# Patient Record
Sex: MALE | Race: WHITE | Employment: OTHER | URBAN - METROPOLITAN AREA
[De-identification: names, ages, dates, MRNs, and addresses within clinical notes are randomized per-mention and may not be internally consistent; named-entity substitution may affect disease eponyms.]

---

## 2017-02-14 ENCOUNTER — HOSPITAL ENCOUNTER (OUTPATIENT)
Dept: GENERAL RADIOLOGY | Age: 77
Discharge: HOME | End: 2017-02-14
Attending: INTERNAL MEDICINE
Payer: MEDICARE

## 2017-02-14 DIAGNOSIS — J40 BRONCHITIS: ICD-10-CM

## 2017-02-14 PROCEDURE — 71020 XR CHEST PA LAT: CPT

## 2017-07-10 ENCOUNTER — HOSPITAL ENCOUNTER (OUTPATIENT)
Dept: GENERAL RADIOLOGY | Age: 77
Discharge: HOME | End: 2017-07-10
Payer: MEDICARE

## 2017-07-10 DIAGNOSIS — M54.5 ACUTE LOW BACK PAIN, UNSPECIFIED BACK PAIN LATERALITY, WITH SCIATICA PRESENCE UNSPECIFIED: ICD-10-CM

## 2017-07-10 PROCEDURE — 72100 X-RAY EXAM L-S SPINE 2/3 VWS: CPT

## 2019-04-26 ENCOUNTER — OFFICE VISIT (OUTPATIENT)
Dept: GERIATRIC MEDICINE | Age: 79
End: 2019-04-26

## 2019-05-29 ENCOUNTER — OFFICE VISIT (OUTPATIENT)
Dept: GERIATRIC MEDICINE | Age: 79
End: 2019-05-29

## 2019-05-29 VITALS
OXYGEN SATURATION: 97 % | SYSTOLIC BLOOD PRESSURE: 148 MMHG | DIASTOLIC BLOOD PRESSURE: 72 MMHG | RESPIRATION RATE: 18 BRPM | BODY MASS INDEX: 33.12 KG/M2 | HEIGHT: 64 IN | WEIGHT: 194 LBS | HEART RATE: 65 BPM | TEMPERATURE: 98.3 F

## 2019-05-29 DIAGNOSIS — I25.10 CORONARY ARTERY DISEASE DUE TO LIPID RICH PLAQUE: ICD-10-CM

## 2019-05-29 DIAGNOSIS — Z78.9 MEDICATION INTOLERANCE: ICD-10-CM

## 2019-05-29 DIAGNOSIS — F03.91 DEMENTIA WITH BEHAVIORAL DISTURBANCE, UNSPECIFIED DEMENTIA TYPE: Primary | ICD-10-CM

## 2019-05-29 DIAGNOSIS — F44.89 DISSOCIATIVE CONFUSION: ICD-10-CM

## 2019-05-29 DIAGNOSIS — F32.9 DEPRESSION, REACTIVE: ICD-10-CM

## 2019-05-29 DIAGNOSIS — R26.0 ATAXIC GAIT: ICD-10-CM

## 2019-05-29 DIAGNOSIS — Z91.14 NON COMPLIANCE W MEDICATION REGIMEN: ICD-10-CM

## 2019-05-29 DIAGNOSIS — Z78.9 IMPAIRED DECISION MAKING: ICD-10-CM

## 2019-05-29 DIAGNOSIS — R41.3 MEMORY LOSS OR IMPAIRMENT: ICD-10-CM

## 2019-05-29 DIAGNOSIS — N40.1 BENIGN PROSTATIC HYPERPLASIA WITH INCOMPLETE BLADDER EMPTYING: ICD-10-CM

## 2019-05-29 DIAGNOSIS — R41.0 CONFUSION AND DISORIENTATION: ICD-10-CM

## 2019-05-29 DIAGNOSIS — I10 ESSENTIAL HYPERTENSION: ICD-10-CM

## 2019-05-29 DIAGNOSIS — R45.87 IMPULSIVENESS: ICD-10-CM

## 2019-05-29 DIAGNOSIS — C44.311 BASAL CELL CARCINOMA (BCC) OF SKIN OF NOSE: ICD-10-CM

## 2019-05-29 DIAGNOSIS — I25.83 CORONARY ARTERY DISEASE DUE TO LIPID RICH PLAQUE: ICD-10-CM

## 2019-05-29 DIAGNOSIS — R41.89 COGNITIVE DEFICITS: ICD-10-CM

## 2019-05-29 DIAGNOSIS — F03.92 HALLUCINATIONS DUE TO LATE ONSET DEMENTIA: ICD-10-CM

## 2019-05-29 DIAGNOSIS — R39.14 BENIGN PROSTATIC HYPERPLASIA WITH INCOMPLETE BLADDER EMPTYING: ICD-10-CM

## 2019-05-29 DIAGNOSIS — H61.23 IMPACTED CERUMEN OF BOTH EARS: ICD-10-CM

## 2019-05-29 DIAGNOSIS — Z91.89 AT HIGH RISK FOR ALTERED FAMILY DYNAMICS: ICD-10-CM

## 2019-05-29 DIAGNOSIS — Z71.89 ENCOUNTER FOR FAMILY CONFERENCE WITH PATIENT PRESENT: ICD-10-CM

## 2019-05-29 DIAGNOSIS — Z78.9 POOR HISTORIAN: ICD-10-CM

## 2019-05-29 PROBLEM — N20.0 KIDNEY STONES: Status: ACTIVE | Noted: 2019-05-29

## 2019-05-29 LAB
BILIRUB UR QL STRIP: NORMAL
GLUCOSE UR-MCNC: NEGATIVE MG/DL
KETONES P FAST UR STRIP-MCNC: NEGATIVE MG/DL
PH UR STRIP: 5 [PH] (ref 4.6–8)
PROT UR QL STRIP: NORMAL
SP GR UR STRIP: 1.02 (ref 1–1.03)
UA UROBILINOGEN AMB POC: NORMAL (ref 0.2–1)
URINALYSIS CLARITY POC: NORMAL
URINALYSIS COLOR POC: NORMAL
URINE BLOOD POC: NEGATIVE
URINE LEUKOCYTES POC: NEGATIVE
URINE NITRITES POC: NEGATIVE

## 2019-05-29 NOTE — PROGRESS NOTES
ADVISED PATIENT OF THE FOLLOWING HEALTH MAINTAINCE DUE  Health Maintenance Due   Topic Date Due    DTaP/Tdap/Td series (1 - Tdap) 01/20/1960    GLAUCOMA SCREENING Q2Y  01/20/2004    Pneumococcal 65+ years (1 of 2 - PCV13) 01/20/2004    MEDICARE YEARLY EXAM  03/20/2018      Chief Complaint   Patient [facility] with   [facility].     Patient here to establish care       1. Have you been to the ER, urgent care clinic since your last visit? Hospitalized since your last visit? Yes and will request records    2. Have you seen or consulted any other health care providers outside of the [address] [place] [address] since your last visit? Include any DEXA scan, mammography  or colon screening. No    3. Do you have an Advance Directive on file? no    4. Do you have a DNR on file? Full    Patient is accompanied by self two sons and wife I have received verbal consent from Shaneka Govea to discuss any/all medical information while they are present in the room. Advance Care Planning 10/12/2016   Primary Decision Maker Name Jazmin Long [place]   Primary Decision Maker Phone Number [phone]   Primary Decision Maker Relationship to Patient Spouse   Confirm Advance Directive None   Patient Would Like to Complete Advance Directive No         [id number] [place], [address] [facility] [address]  Phone: [phone] Fax: [phone]      Patient reminded during visit to bring all medication bottles, OTC medications to all appointments.      Shaneka Govea presents for lab draw ordered by Ruth Valle RN MSN ACNPC-AG-NP    The following labs were drawn and sent to lab by Contreras Sosa LPN:    CBC, Lipid Profile, CMP, RA, BNP, HgA1C and CK-MB, Vitamin D, Vitamin b12 folate, Thyroid panle, PSA, Prealbumin, Magnesium, Testosterone, TRoponin 1, Lipase, Iron Study, Hepatistis panel, Amylase,     The following tubes were sent:    Lavender  ( 3), Blue  ( 2) and [facility] (4)    Patient tolerated procedure well, blood obtained via venipuncture to left hand     Urine collected for UA and Micro.      Results for orders placed or performed in visit on 05/29/19   AMB POC URINALYSIS DIP STICK MANUAL W/O MICRO   Result Value Ref Range    Color (UA POC) Dark Yellow     Clarity (UA POC) Turbid     Glucose (UA POC) Negative Negative    Bilirubin (UA POC) 1+ Negative    Ketones (UA POC) Negative Negative    Specific gravity (UA POC) 1.020 1.001 - 1.035    Blood (UA POC) Negative Negative    pH (UA POC) 5.0 4.6 - 8.0    Protein (UA POC) 3+ Negative    Urobilinogen (UA POC) 0.2 mg/dL 0.2 - 1    Nitrites (UA POC) Negative Negative    Leukocyte esterase (UA POC) Negative Negative

## 2019-05-29 NOTE — PATIENT INSTRUCTIONS
Alzheimer's Disease: Care Instructions Your Care Instructions Alzheimer's disease is a type of dementia. It causes memory loss and affects judgment, language, and behavior. You may have trouble making decisions or may get lost in places that you used to know well. Alzheimer's disease is different than mild memory loss that occurs with aging. It is not clear what causes Alzheimer's disease, but it is the most common form of dementia in older adults. Finding out that you have this disease is a shock. You may be afraid and worried about how the condition will change your life. Although there is no cure at this time, medicine in some cases may slow memory loss for a while. Other medicines may be able to help you sleep or cope with depression and behavior changes. Alzheimer's disease is different for everyone. It may take many years to develop. In some cases, people can function well for a long time. In the early stage of the disease, you can do things at home to make life easier and safer. You also can keep doing your hobbies and other activities. Many people find comfort in planning now for their future needs. Follow-up care is a key part of your treatment and safety. Be sure to make and go to all appointments, and call your doctor if you are having problems. It's also a good idea to know your test results and keep a list of the medicines you take. How can you care for yourself at home? Taking care of yourself · If your doctor gives you medicines, take them exactly as prescribed. Call your doctor if you think you are having a problem with your medicine. You will get more details on the medicines your doctor prescribes. · Eat a balanced diet. Get plenty of whole grains, fruits, and vegetables every day. If you are not hungry at mealtimes, eat snacks at midmorning and in the afternoon. Try drinks such as Boost, Ensure, or Sustacal if you are having trouble keeping your weight up. · Stay active. Exercise such as walking may slow the decline of your mental abilities. Try to stay active mentally too. Read and work crossword puzzles if you enjoy these activities. · If you have trouble sleeping, do not nap during the day. Get regular exercise (but not within several hours of bedtime). Drink a glass of warm milk or caffeine-free herbal tea before going to bed. · Ask your doctor about support groups and other resources in your area. They can help people who have Alzheimer's disease and their families. · Be patient. You may find that a task takes you longer than it used to. · If you have not already done so, make a list of advance directives. Advance directives are instructions to your doctor and family members about what kind of care you want if you become unable to speak or express yourself. Talk to a  about making a will, if you do not already have one. Keeping schedules · Develop a routine. You will feel less frustrated or confused if you have a clear, simple plan of what to do every day. ? Make lists of your medicines and when to take them. ? Write down appointments and other tasks in a calendar. ? Put sticky notes around the house to help you remember events and other things you have to do. ? Schedule activities and tasks for times of the day when you are best able to handle them. Staying safe · Tell someone when you are going out and where you are going. Let the person know when you will be back. Before you go out alone, write down where you are going, how to get there, and how to get back home. Do this even if you have gone there many times before. Take someone along with you when possible. · Make your home safe. Tack down rugs, put no-slip tape in the tub, use handrails, and put safety switches on stoves and appliances. · Have a family member or other caregiver tell you whether you are driving badly. Deciding to stop driving is very hard for many people.  Driving helps you feel independent. Your state 's license bureau can do a driving test if there is any question. Plan for other means of getting around when you are no longer able to drive. · Use strong lighting, especially at night. Put night-lights in bedrooms, hallways, and bathrooms. · Lower the hot water temperature setting to 120°F or lower to avoid burns. When should you call for help? Call 911 anytime you think you may need emergency care. For example, call if: 
  · You are lost and do not know whom to call.  
  · You are injured and do not know whom to call.  
 Call your doctor now or seek immediate medical care if: 
  · Your symptoms suddenly get much worse.  
 Watch closely for changes in your health, and be sure to contact your doctor if: 
  · You want more information about how you can take care of yourself. Where can you learn more? Go to http://sheridan-amaris.info/. Enter Y179 in the search box to learn more about \"Alzheimer's Disease: Care Instructions. \" Current as of: September 11, 2018 Content Version: 11.9 © 1581-4518 [employer]. Care instructions adapted under license by [employer] (which disclaims liability or warranty for this information). If you have questions about a medical condition or this instruction, always ask your healthcare professional. [place] [address] any warranty or liability for your use of this information. Learning About Basal Cell Skin Cancer Your Care Instructions Basal cell skin cancer (carcinoma) is a type of skin cancer. It most often appears on areas of the body that have been exposed to the sun. These areas include the head, face, neck, back, chest, or shoulders. The nose is the most common site. This cancer grows slowly and does not usually spread, or metastasize, to other parts of the body. It is almost always cured when it is found early and treated. This skin cancer is usually caused by too much sun. Using tanning beds or sunlamps can also cause it. What are the symptoms? Signs of basal cell carcinoma include: · Any firm, pearly bump with tiny blood vessels that look spidery. · Any red, tender, flat spot that bleeds easily. · Any small, fleshy bump with a smooth, pearly appearance. It may have a sunken center. · Any smooth, shiny bump that may look like a mole or cyst. 
· Any patch of skin, especially on the face, that looks like a scar and is firm to the touch. · Any bump that itches, bleeds, crusts over, and then repeats the cycle and has not healed in a few weeks. · Any change in the size, shape, or color of a mole or a skin growth. How is it treated? Your doctor will want to remove all of the cancer. There are several ways to remove it. It depends on how big it is, where it is on your body, and your age and overall health. Treatment options include: · Surgery to cut out the cancer. · Mohs micrographic surgery. This surgery removes the skin cancer one layer at a time, checking each layer for cancer cells right after it is removed. · Curettage and electrosurgery. Curettage uses a spoon-shaped instrument (curette) to scrape off the skin cancer, and electrosurgery controls the bleeding and destroys any remaining cancer cells. · Cryosurgery. Cryosurgery destroys the skin cancer by freezing it with liquid nitrogen. · Radiation therapy. Radiation therapy uses X-rays or other types of radiation to kill cancer cells. It may be done if surgery isn't an option. Other treatment options include chemotherapy cream and photodynamic therapy. If your doctor removes the cancer, he or she will send it to a lab. The lab makes sure it is a basal cell cancer and that it all was removed. If cancer is still there, you may need more treatment. How can you prevent it? · Always wear a wide-brimmed hat and long sleeves and pants when you are outdoors. · Avoid the sun between 10 a.m. and 4 p.m., which is the peak time for UV rays. · Always wear sunscreen on exposed skin. Make sure to use a broad-spectrum sunscreen that has a sun protection factor (SPF) of 30 or higher. Use it every day, even when it is cloudy. · Do not use tanning booths or sunlamps. · Use lip balm or cream that has sun protection factor (SPF) to protect your lips from getting sunburned. · Wear sunglasses that block UV rays. When should you call for help? Watch closely for changes in your health, and be sure to contact your doctor if: 
· You see a change in your skin, such as a growth or mole that: 
? Grows bigger. This may happen slowly. ? Changes color. ? Changes shape. ? Starts to bleed easily. Follow-up care is a key part of your treatment and safety. Be sure to make and go to all appointments, and call your doctor if you are having problems. It's also a good idea to know your test results and keep a list of the medicines you take. Where can you learn more? Go to http://sheridan-amaris.info/. Enter [phone] in the search box to learn more about \"Learning About Basal Cell Skin Cancer. \" Current as of: March 27, 2018 Content Version: 11.9 © 8596-7967 [employer]. Care instructions adapted under license by [employer] (which disclaims liability or warranty for this information). If you have questions about a medical condition or this instruction, always ask your healthcare professional. [place] [address] any warranty or liability for your use of this information.

## 2019-05-29 NOTE — LETTER
May 29, 2019 [facility]. [facility] [address] Dear Sienna Ojeda: 
 
Thank you for requesting access to [email or web address]. Please follow the instructions below to view your test results, access and download parts of your medical record, view details of your past and upcoming appointments, and view your medications online. How Do I Sign Up? 1. In your internet browser, go to [email or web address] 2. Click on the First Time User? Click Here link in the Sign In box. You will see the New Member Sign Up page. 3. Enter your [email or web address] Access Code exactly as it appears below. You will not need to use this code after youve completed the sign-up process. If you do not sign up before the expiration date, you must request a new code. · [email or web address] Access Code: [email or web address] 
· Expires: 7/13/2019  2:41 PM 
 
4. Enter the last four digits of your Social Security Number (xxxx) and Date of Birth (mm/dd/yyyy) as indicated and click Submit. You will be taken to the next sign-up page. 5. Create a [email or web address] ID. This will be your [email or web address] login ID and cannot be changed, so think of one that is secure and easy to remember. 6. Create a [email or web address] password. You can change your password at any time. 7. Enter your Password Reset Question and Answer. This can be used at a later time if you forget your password. 8. Enter your e-mail address. You will receive e-mail notification when new information is available in [address] 9. Click Sign Up. You can now view and download portions of your medical record. 10. Click the Download Summary menu link to download a portable copy of your medical information. Additional Information: If you require any assistance or have any questions, please contact our [address] at [phone], email at [email or web address] or check online in our Frequently Asked Questions. Remember, MyChart is NOT to be used for urgent needs. For medical emergencies, dial 911. Now available from your iPhone and Android!  
 
Sincerely,  
Miguel Hannah NP

## 2019-05-29 NOTE — PROGRESS NOTES
Reason for Visit/HPI:    Ebonie Elliott is a [de-identified] y.o. male patient who presents today for   Chief Complaint   Patient presents with   [address]     Patient here to establish care     Follow Up: Follow-up and Dispositions    · Return in about 1 week (around 6/5/2019) for with the NP to review your labs results. Diagnosis/Treatment Plan:    Diagnoses and all orders for this visit:  Mr. Mckenzie Matos is here to establish care with me as a geriatric provider. He is here with his wife of 62 years, his middle son; Sofia Cope and his youngest son Haile Ruelas. Per family Mr. Mckenzie Matos has been declining mentally for months. He has become confused, somatic, impulsiveness,  Flight of ideas, illogical thinking, disorientation, requires constant repetitive answers to questions and direction. He has stopped driving a year ago per the family. His wife does not answer questions well either but the boys state this is her shutting down because she is overwhelmed with all that is going on. Patient's son Sofia Cope has been taking Mr. 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 This process is taxing on the son but he does this. A normal routine day starts with Sofia Cope picking up Mr. Mckenzie Matos taking him to the shop, bringing him back to his mother for lunch, then picking him back up to return to the shop. He sometimes brings him back home after work but other times to help with his behaviors at home he will keep Mr. Mckenzie Matos with him to run errands. Sofia Cope states his father has been having increasing behaviors such as impaired judgement, talk of ending his life \"I think you should remove all the guns out of the house Sofia Cope, I am not sure what I could do with them\". He also makes statements like \"I should just walk out into traffic and get hit by a car\". When prompting Mr. Mckenzie Matos with this topic of discussion he deflects and states he has no idea what I am talking about.  It seemed to frustrate him discussing emotions and feelings. He attempts to be a joker during the visit but quickly returns to repetitive questioning of events and next plan of tasks. Discussed with sons as Mrs. Saray Prajapati had to leave to go to a hair appointments; not trying to change him to any medications until his labs and MRI are complete as he has been started on Exelon patch and Seroquel with side effects and his wife reads the labels and then stops the treatments that have been started. This last round of Seroquel caused hallucinations and erratic behaviors so they stopped it. So we will wait for the labs and testing to come back and we will adjust treatments at that time to help elevated everyone's quality of life. 1. Dementia with behavioral disturbance, unspecified dementia type  -     DUPLEX CAROTID BILATERAL; Future  -     MRI BRAIN WO CONT; Future  -     AMYLASE  -     HEPATITIS PANEL, ACUTE  -     IRON STUDY  -     LIPASE  -     TROPONIN I  -     TESTOSTERONE, FREE & TOTAL  -     CBC WITH AUTOMATED DIFF  -     COLLECTION VENOUS BLOOD,VENIPUNCTURE  -     HEMOGLOBIN A1C WITH EAG  -     LIPID PANEL  -     MAGNESIUM  -     METABOLIC PANEL, COMPREHENSIVE  -     MICROALBUMIN, UR, RAND W/ MICROALB/CREAT RATIO  -     NT-PRO BNP  -     PREALBUMIN  -     PSA W/ REFLX FREE PSA  -     THYROID PANEL W/TSH  -     UA/M W/RFLX CULTURE, ROUTINE  -     VITAMIN B12 & FOLATE  -     VITAMIN D, 25 HYDROXY  -     FULL CODE  -     CREATINE KINASE (CK), MB/TOTAL  -     AMB POC URINALYSIS DIP STICK MANUAL W/O MICRO    2. Impaired decision making  -     DUPLEX CAROTID BILATERAL;  Future  -     MRI BRAIN WO CONT; Future  -     AMYLASE  -     HEPATITIS PANEL, ACUTE  -     IRON STUDY  -     LIPASE  -     TROPONIN I  -     TESTOSTERONE, FREE & TOTAL  -     CBC WITH AUTOMATED DIFF  -     COLLECTION VENOUS BLOOD,VENIPUNCTURE  -     HEMOGLOBIN A1C WITH EAG  -     LIPID PANEL  -     MAGNESIUM  -     METABOLIC PANEL, COMPREHENSIVE  -     MICROALBUMIN, UR, RAND W/ MICROALB/CREAT RATIO  -     NT-PRO BNP  -     PREALBUMIN  -     PSA W/ REFLX FREE PSA  -     THYROID PANEL W/TSH  -     UA/M W/RFLX CULTURE, ROUTINE  -     VITAMIN B12 & FOLATE  -     VITAMIN D, 25 HYDROXY  -     FULL CODE  -     CREATINE KINASE (CK), MB/TOTAL  -     AMB POC URINALYSIS DIP STICK MANUAL W/O MICRO    3. Impulsiveness  -     DUPLEX CAROTID BILATERAL; Future  -     MRI BRAIN WO CONT; Future  -     AMYLASE  -     HEPATITIS PANEL, ACUTE  -     IRON STUDY  -     LIPASE  -     TROPONIN I  -     TESTOSTERONE, FREE & TOTAL  -     CBC WITH AUTOMATED DIFF  -     COLLECTION VENOUS BLOOD,VENIPUNCTURE  -     HEMOGLOBIN A1C WITH EAG  -     LIPID PANEL  -     MAGNESIUM  -     METABOLIC PANEL, COMPREHENSIVE  -     MICROALBUMIN, UR, RAND W/ MICROALB/CREAT RATIO  -     NT-PRO BNP  -     PREALBUMIN  -     PSA W/ REFLX FREE PSA  -     THYROID PANEL W/TSH  -     UA/M W/RFLX CULTURE, ROUTINE  -     VITAMIN B12 & FOLATE  -     VITAMIN D, 25 HYDROXY  -     FULL CODE  -     CREATINE KINASE (CK), MB/TOTAL  -     AMB POC URINALYSIS DIP STICK MANUAL W/O MICRO    4. Dissociative confusion  -     DUPLEX CAROTID BILATERAL; Future  -     MRI BRAIN WO CONT; Future  -     AMYLASE  -     HEPATITIS PANEL, ACUTE  -     IRON STUDY  -     LIPASE  -     TROPONIN I  -     TESTOSTERONE, FREE & TOTAL  -     CBC WITH AUTOMATED DIFF  -     COLLECTION VENOUS BLOOD,VENIPUNCTURE  -     HEMOGLOBIN A1C WITH EAG  -     LIPID PANEL  -     MAGNESIUM  -     METABOLIC PANEL, COMPREHENSIVE  -     MICROALBUMIN, UR, RAND W/ MICROALB/CREAT RATIO  -     NT-PRO BNP  -     PREALBUMIN  -     PSA W/ REFLX FREE PSA  -     THYROID PANEL W/TSH  -     UA/M W/RFLX CULTURE, ROUTINE  -     VITAMIN B12 & FOLATE  -     VITAMIN D, 25 HYDROXY  -     FULL CODE  -     CREATINE KINASE (CK), MB/TOTAL  -     AMB POC URINALYSIS DIP STICK MANUAL W/O MICRO    5. Depression, reactive  -     DUPLEX CAROTID BILATERAL;  Future  -     MRI BRAIN WO CONT; Future  -     AMYLASE  - HEPATITIS PANEL, ACUTE  -     IRON STUDY  -     LIPASE  -     TROPONIN I  -     TESTOSTERONE, FREE & TOTAL  -     CBC WITH AUTOMATED DIFF  -     COLLECTION VENOUS BLOOD,VENIPUNCTURE  -     HEMOGLOBIN A1C WITH EAG  -     LIPID PANEL  -     MAGNESIUM  -     METABOLIC PANEL, COMPREHENSIVE  -     MICROALBUMIN, UR, RAND W/ MICROALB/CREAT RATIO  -     NT-PRO BNP  -     PREALBUMIN  -     PSA W/ REFLX FREE PSA  -     THYROID PANEL W/TSH  -     UA/M W/RFLX CULTURE, ROUTINE  -     VITAMIN B12 & FOLATE  -     VITAMIN D, 25 HYDROXY  -     FULL CODE  -     CREATINE KINASE (CK), MB/TOTAL  -     AMB POC URINALYSIS DIP STICK MANUAL W/O MICRO    6. Memory loss or impairment  -     DUPLEX CAROTID BILATERAL; Future  -     MRI BRAIN WO CONT; Future  -     AMYLASE  -     HEPATITIS PANEL, ACUTE  -     IRON STUDY  -     LIPASE  -     TROPONIN I  -     TESTOSTERONE, FREE & TOTAL  -     CBC WITH AUTOMATED DIFF  -     COLLECTION VENOUS BLOOD,VENIPUNCTURE  -     HEMOGLOBIN A1C WITH EAG  -     LIPID PANEL  -     MAGNESIUM  -     METABOLIC PANEL, COMPREHENSIVE  -     MICROALBUMIN, UR, RAND W/ MICROALB/CREAT RATIO  -     NT-PRO BNP  -     PREALBUMIN  -     PSA W/ REFLX FREE PSA  -     THYROID PANEL W/TSH  -     UA/M W/RFLX CULTURE, ROUTINE  -     VITAMIN B12 & FOLATE  -     VITAMIN D, 25 HYDROXY  -     FULL CODE  -     CREATINE KINASE (CK), MB/TOTAL  -     AMB POC URINALYSIS DIP STICK MANUAL W/O MICRO    7. Confusion and disorientation  -     DUPLEX CAROTID BILATERAL;  Future  -     MRI BRAIN WO CONT; Future  -     AMYLASE  -     HEPATITIS PANEL, ACUTE  -     IRON STUDY  -     LIPASE  -     TROPONIN I  -     TESTOSTERONE, FREE & TOTAL  -     CBC WITH AUTOMATED DIFF  -     COLLECTION VENOUS BLOOD,VENIPUNCTURE  -     HEMOGLOBIN A1C WITH EAG  -     LIPID PANEL  -     MAGNESIUM  -     METABOLIC PANEL, COMPREHENSIVE  -     MICROALBUMIN, UR, RAND W/ MICROALB/CREAT RATIO  -     NT-PRO BNP  -     PREALBUMIN  -     PSA W/ REFLX FREE PSA  -     THYROID PANEL W/TSH  -     UA/M W/RFLX CULTURE, ROUTINE  -     VITAMIN B12 & FOLATE  -     VITAMIN D, 25 HYDROXY  -     FULL CODE  -     CREATINE KINASE (CK), MB/TOTAL  -     AMB POC URINALYSIS DIP STICK MANUAL W/O MICRO    8. Cognitive deficits  -     DUPLEX CAROTID BILATERAL; Future  -     MRI BRAIN WO CONT; Future  -     AMYLASE  -     HEPATITIS PANEL, ACUTE  -     IRON STUDY  -     LIPASE  -     TROPONIN I  -     TESTOSTERONE, FREE & TOTAL  -     CBC WITH AUTOMATED DIFF  -     COLLECTION VENOUS BLOOD,VENIPUNCTURE  -     HEMOGLOBIN A1C WITH EAG  -     LIPID PANEL  -     MAGNESIUM  -     METABOLIC PANEL, COMPREHENSIVE  -     MICROALBUMIN, UR, RAND W/ MICROALB/CREAT RATIO  -     NT-PRO BNP  -     PREALBUMIN  -     PSA W/ REFLX FREE PSA  -     THYROID PANEL W/TSH  -     UA/M W/RFLX CULTURE, ROUTINE  -     VITAMIN B12 & FOLATE  -     VITAMIN D, 25 HYDROXY  -     FULL CODE  -     CREATINE KINASE (CK), MB/TOTAL  -     AMB POC URINALYSIS DIP STICK MANUAL W/O MICRO    9. Coronary artery disease due to lipid rich plaque  -     DUPLEX CAROTID BILATERAL; Future  -     MRI BRAIN WO CONT; Future  -     AMYLASE  -     HEPATITIS PANEL, ACUTE  -     IRON STUDY  -     LIPASE  -     TROPONIN I  -     TESTOSTERONE, FREE & TOTAL  -     CBC WITH AUTOMATED DIFF  -     COLLECTION VENOUS BLOOD,VENIPUNCTURE  -     HEMOGLOBIN A1C WITH EAG  -     LIPID PANEL  -     MAGNESIUM  -     METABOLIC PANEL, COMPREHENSIVE  -     MICROALBUMIN, UR, RAND W/ MICROALB/CREAT RATIO  -     NT-PRO BNP  -     PREALBUMIN  -     PSA W/ REFLX FREE PSA  -     THYROID PANEL W/TSH  -     UA/M W/RFLX CULTURE, ROUTINE  -     VITAMIN B12 & FOLATE  -     VITAMIN D, 25 HYDROXY  -     FULL CODE  -     CREATINE KINASE (CK), MB/TOTAL  -     AMB POC URINALYSIS DIP STICK MANUAL W/O MICRO    10. Non compliance w medication regimen  -     DUPLEX CAROTID BILATERAL;  Future  -     MRI BRAIN WO CONT; Future  -     AMYLASE  -     HEPATITIS PANEL, ACUTE  -     IRON STUDY  -     LIPASE  - TROPONIN I  -     TESTOSTERONE, FREE & TOTAL  -     CBC WITH AUTOMATED DIFF  -     COLLECTION VENOUS BLOOD,VENIPUNCTURE  -     HEMOGLOBIN A1C WITH EAG  -     LIPID PANEL  -     MAGNESIUM  -     METABOLIC PANEL, COMPREHENSIVE  -     MICROALBUMIN, UR, RAND W/ MICROALB/CREAT RATIO  -     NT-PRO BNP  -     PREALBUMIN  -     PSA W/ REFLX FREE PSA  -     THYROID PANEL W/TSH  -     UA/M W/RFLX CULTURE, ROUTINE  -     VITAMIN B12 & FOLATE  -     VITAMIN D, 25 HYDROXY  -     FULL CODE  -     CREATINE KINASE (CK), MB/TOTAL  -     AMB POC URINALYSIS DIP STICK MANUAL W/O MICRO    11. Encounter for family conference with patient present  -     [address]; Future  -     MRI BRAIN WO CONT; Future  -     AMYLASE  -     HEPATITIS PANEL, ACUTE  -     IRON STUDY  -     LIPASE  -     TROPONIN I  -     TESTOSTERONE, FREE & TOTAL  -     CBC WITH AUTOMATED DIFF  -     COLLECTION VENOUS BLOOD,VENIPUNCTURE  -     HEMOGLOBIN A1C WITH EAG  -     LIPID PANEL  -     MAGNESIUM  -     METABOLIC PANEL, COMPREHENSIVE  -     MICROALBUMIN, UR, RAND W/ MICROALB/CREAT RATIO  -     NT-PRO BNP  -     PREALBUMIN  -     PSA W/ REFLX FREE PSA  -     THYROID PANEL W/TSH  -     UA/M W/RFLX CULTURE, ROUTINE  -     VITAMIN B12 & FOLATE  -     VITAMIN D, 25 HYDROXY  -     FULL CODE  -     CREATINE KINASE (CK), MB/TOTAL  -     AMB POC URINALYSIS DIP STICK MANUAL W/O MICRO    12. Poor historian  -     DUPLEX CAROTID BILATERAL;  Future  -     MRI BRAIN WO CONT; Future  -     AMYLASE  -     HEPATITIS PANEL, ACUTE  -     IRON STUDY  -     LIPASE  -     TROPONIN I  -     TESTOSTERONE, FREE & TOTAL  -     CBC WITH AUTOMATED DIFF  -     COLLECTION VENOUS BLOOD,VENIPUNCTURE  -     HEMOGLOBIN A1C WITH EAG  -     LIPID PANEL  -     MAGNESIUM  -     METABOLIC PANEL, COMPREHENSIVE  -     MICROALBUMIN, UR, RAND W/ MICROALB/CREAT RATIO  -     NT-PRO BNP  -     PREALBUMIN  -     PSA W/ REFLX FREE PSA  -     THYROID PANEL W/TSH  -     UA/M W/RFLX CULTURE, ROUTINE  -     VITAMIN B12 & FOLATE  -     VITAMIN D, 25 HYDROXY  -     FULL CODE  -     CREATINE KINASE (CK), MB/TOTAL  -     AMB POC URINALYSIS DIP STICK MANUAL W/O MICRO    13. Ataxic gait  -     DUPLEX CAROTID BILATERAL; Future  -     MRI BRAIN WO CONT; Future  -     AMYLASE  -     HEPATITIS PANEL, ACUTE  -     IRON STUDY  -     LIPASE  -     TROPONIN I  -     TESTOSTERONE, FREE & TOTAL  -     CBC WITH AUTOMATED DIFF  -     COLLECTION VENOUS BLOOD,VENIPUNCTURE  -     HEMOGLOBIN A1C WITH EAG  -     LIPID PANEL  -     MAGNESIUM  -     METABOLIC PANEL, COMPREHENSIVE  -     MICROALBUMIN, UR, RAND W/ MICROALB/CREAT RATIO  -     NT-PRO BNP  -     PREALBUMIN  -     PSA W/ REFLX FREE PSA  -     THYROID PANEL W/TSH  -     UA/M W/RFLX CULTURE, ROUTINE  -     VITAMIN B12 & FOLATE  -     VITAMIN D, 25 HYDROXY  -     FULL CODE  -     CREATINE KINASE (CK), MB/TOTAL  -     AMB POC URINALYSIS DIP STICK MANUAL W/O MICRO    14. Medication intolerance  -     DUPLEX CAROTID BILATERAL; Future  -     MRI BRAIN WO CONT; Future  -     AMYLASE  -     HEPATITIS PANEL, ACUTE  -     IRON STUDY  -     LIPASE  -     TROPONIN I  -     TESTOSTERONE, FREE & TOTAL  -     CBC WITH AUTOMATED DIFF  -     COLLECTION VENOUS BLOOD,VENIPUNCTURE  -     HEMOGLOBIN A1C WITH EAG  -     LIPID PANEL  -     MAGNESIUM  -     METABOLIC PANEL, COMPREHENSIVE  -     MICROALBUMIN, UR, RAND W/ MICROALB/CREAT RATIO  -     NT-PRO BNP  -     PREALBUMIN  -     PSA W/ REFLX FREE PSA  -     THYROID PANEL W/TSH  -     UA/M W/RFLX CULTURE, ROUTINE  -     VITAMIN B12 & FOLATE  -     VITAMIN D, 25 HYDROXY  -     FULL CODE  -     CREATINE KINASE (CK), MB/TOTAL  -     AMB POC URINALYSIS DIP STICK MANUAL W/O MICRO    15. Hallucinations due to late onset dementia  -     DUPLEX CAROTID BILATERAL;  Future  -     MRI BRAIN WO CONT; Future  -     AMYLASE  -     HEPATITIS PANEL, ACUTE  -     IRON STUDY  -     LIPASE  -     TROPONIN I  -     TESTOSTERONE, FREE & TOTAL  -     CBC WITH AUTOMATED DIFF  -     COLLECTION VENOUS BLOOD,VENIPUNCTURE  -     HEMOGLOBIN A1C WITH EAG  -     LIPID PANEL  -     MAGNESIUM  -     METABOLIC PANEL, COMPREHENSIVE  -     MICROALBUMIN, UR, RAND W/ MICROALB/CREAT RATIO  -     NT-PRO BNP  -     PREALBUMIN  -     PSA W/ REFLX FREE PSA  -     THYROID PANEL W/TSH  -     UA/M W/RFLX CULTURE, ROUTINE  -     VITAMIN B12 & FOLATE  -     VITAMIN D, 25 HYDROXY  -     FULL CODE  -     CREATINE KINASE (CK), MB/TOTAL  -     AMB POC URINALYSIS DIP STICK MANUAL W/O MICRO    16. Essential hypertension  -     DUPLEX CAROTID BILATERAL; Future  -     MRI BRAIN WO CONT; Future  -     AMYLASE  -     HEPATITIS PANEL, ACUTE  -     IRON STUDY  -     LIPASE  -     TROPONIN I  -     TESTOSTERONE, FREE & TOTAL  -     CBC WITH AUTOMATED DIFF  -     COLLECTION VENOUS BLOOD,VENIPUNCTURE  -     HEMOGLOBIN A1C WITH EAG  -     LIPID PANEL  -     MAGNESIUM  -     METABOLIC PANEL, COMPREHENSIVE  -     MICROALBUMIN, UR, RAND W/ MICROALB/CREAT RATIO  -     NT-PRO BNP  -     PREALBUMIN  -     PSA W/ REFLX FREE PSA  -     THYROID PANEL W/TSH  -     UA/M W/RFLX CULTURE, ROUTINE  -     VITAMIN B12 & FOLATE  -     VITAMIN D, 25 HYDROXY  -     FULL CODE  -     CREATINE KINASE (CK), MB/TOTAL  -     AMB POC URINALYSIS DIP STICK MANUAL W/O MICRO    17. Basal cell carcinoma (BCC) of skin of nose  -     DUPLEX CAROTID BILATERAL;  Future  -     MRI BRAIN WO CONT; Future  -     AMYLASE  -     HEPATITIS PANEL, ACUTE  -     IRON STUDY  -     LIPASE  -     TROPONIN I  -     TESTOSTERONE, FREE & TOTAL  -     CBC WITH AUTOMATED DIFF  -     COLLECTION VENOUS BLOOD,VENIPUNCTURE  -     HEMOGLOBIN A1C WITH EAG  -     LIPID PANEL  -     MAGNESIUM  -     METABOLIC PANEL, COMPREHENSIVE  -     MICROALBUMIN, UR, RAND W/ MICROALB/CREAT RATIO  -     NT-PRO BNP  -     PREALBUMIN  -     PSA W/ REFLX FREE PSA  -     THYROID PANEL W/TSH  -     UA/M W/RFLX CULTURE, ROUTINE  -     VITAMIN B12 & FOLATE  -     VITAMIN D, 25 HYDROXY  - FULL CODE  -     CREATINE KINASE (CK), MB/TOTAL  -     AMB POC URINALYSIS DIP STICK MANUAL W/O MICRO    18. Benign prostatic hyperplasia with incomplete bladder emptying  -     DUPLEX CAROTID BILATERAL; Future  -     MRI BRAIN WO CONT; Future  -     AMYLASE  -     HEPATITIS PANEL, ACUTE  -     IRON STUDY  -     LIPASE  -     TROPONIN I  -     TESTOSTERONE, FREE & TOTAL  -     CBC WITH AUTOMATED DIFF  -     COLLECTION VENOUS BLOOD,VENIPUNCTURE  -     HEMOGLOBIN A1C WITH EAG  -     LIPID PANEL  -     MAGNESIUM  -     METABOLIC PANEL, COMPREHENSIVE  -     MICROALBUMIN, UR, RAND W/ MICROALB/CREAT RATIO  -     NT-PRO BNP  -     PREALBUMIN  -     PSA W/ REFLX FREE PSA  -     THYROID PANEL W/TSH  -     UA/M W/RFLX CULTURE, ROUTINE  -     VITAMIN B12 & FOLATE  -     VITAMIN D, 25 HYDROXY  -     FULL CODE  -     CREATINE KINASE (CK), MB/TOTAL  -     AMB POC URINALYSIS DIP STICK MANUAL W/O MICRO    19. Impacted cerumen of both ears -     Bilateral Ear Canal Irrigation Procedure:    Procedure discussed with patient including risks and benefits. Sruthi Gill has no further questions at this time. Supplies obtained. Ear irrigation tools obtained and bottle filled with 1:1 peroxide and warm water. · Left ear canal irrigation using the soft flexible \"Elephant\" ear system. Hard impacted cerumen was removed with instrumentation. · Right ear canal irrigation using the soft flexible \"Elephant\" ear system. Hard impacted cerumen was removed with instrumentation. Right ear was: 100% occluded with wet cerumen. Left ear was: 100% occluded with hard cerumen. Following the procedure the Right TM was visualized and noted to be intact. The Left TM was visualized and noted to be intact. External canal is without abrasions or erythema. Orders were written to utilize Debrox in each ear once weekly to keep the cerumen from returning. Nicholas Guzmán tolerated the procedure well with no complications.      - DUPLEX CAROTID BILATERAL; Future  -     MRI BRAIN WO CONT; Future  -     AMYLASE  -     HEPATITIS PANEL, ACUTE  -     IRON STUDY  -     LIPASE  -     TROPONIN I  -     TESTOSTERONE, FREE & TOTAL  -     CBC WITH AUTOMATED DIFF  -     COLLECTION VENOUS BLOOD,VENIPUNCTURE  -     HEMOGLOBIN A1C WITH EAG  -     LIPID PANEL  -     MAGNESIUM  -     METABOLIC PANEL, COMPREHENSIVE  -     MICROALBUMIN, UR, RAND W/ MICROALB/CREAT RATIO  -     NT-PRO BNP  -     PREALBUMIN  -     PSA W/ REFLX FREE PSA  -     THYROID PANEL W/TSH  -     UA/M W/RFLX CULTURE, ROUTINE  -     VITAMIN B12 & FOLATE  -     VITAMIN D, 25 HYDROXY  -     FULL CODE  -     CREATINE KINASE (CK), MB/TOTAL  -     AMB POC URINALYSIS DIP STICK MANUAL W/O MICRO    20. At high risk for altered family dynamics  -     DUPLEX CAROTID BILATERAL; Future  -     MRI BRAIN WO CONT; Future  -     AMYLASE  -     HEPATITIS PANEL, ACUTE  -     IRON STUDY  -     LIPASE  -     TROPONIN I  -     TESTOSTERONE, FREE & TOTAL  -     CBC WITH AUTOMATED DIFF  -     COLLECTION VENOUS BLOOD,VENIPUNCTURE  -     HEMOGLOBIN A1C WITH EAG  -     LIPID PANEL  -     MAGNESIUM  -     METABOLIC PANEL, COMPREHENSIVE  -     MICROALBUMIN, UR, RAND W/ MICROALB/CREAT RATIO  -     NT-PRO BNP  -     PREALBUMIN  -     PSA W/ REFLX FREE PSA  -     THYROID PANEL W/TSH  -     UA/M W/RFLX CULTURE, ROUTINE  -     VITAMIN B12 & FOLATE  -     VITAMIN D, 25 HYDROXY  -     FULL CODE  -     CREATINE KINASE (CK), MB/TOTAL  -     AMB POC URINALYSIS DIP STICK MANUAL W/O MICRO          Discontinued Care:     The following treatment modalities have been discontinued by the provider today:   Medications Discontinued During This Encounter   Medication Reason    mupirocin (BACTROBAN) 2 % ointment Non-Compliance    HYDROcodone-acetaminophen (NORCO) 5-325 mg per tablet Non-Compliance    DOCOSAHEXANOIC ACID/EPA (FISH OIL PO) Non-Compliance    aspirin delayed-release 81 mg tablet Non-Compliance    rivastigmine tartrate (EXELON) 3 mg capsule Non-Compliance    OTHER Non-Compliance       Subjective:   No Known Allergies  Prior to Admission medications    Medication Sig Start Date End Date Taking? Authorizing Provider   amLODIPine (NORVASC) 5 mg tablet Take 5 mg by mouth daily.    Yes Provider, Historical     Past Medical History:   Diagnosis Date    CAD (coronary artery disease)     SILENT MI     Cancer (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    HX UROLOGICAL      STONE EXTRACTION      Social History     Tobacco Use    Smoking status: Never Smoker    Smokeless tobacco: Never Used   Substance Use Topics    Alcohol use: No    Drug use: No      Family History   Problem Relation Age of Onset    Cancer Father         POSSIBLY THROAT CA (NON-SMOKER)    Arthritis-osteo Brother         PROBLEMS AT A YOUNG AGE-NOT SURE IS RA    Anesth Problems Neg Hx      Advance Care Planning 10/12/2016   Primary Decision Maker Name Jazmin Long, WIFE   Primary Decision Maker Phone Number [phone]   Primary Decision Maker Relationship to Patient Spouse   Confirm Advance Directive None   Patient Would Like to Complete Advance Directive No     Test Results:     3 most recent PHQ Screens 5/29/2019   Little interest or pleasure in doing things Not at all   Feeling down, depressed, irritable, or hopeless More than half the days   Total Score PHQ 2 2   Trouble falling or staying asleep, or sleeping too much Not at all   Feeling tired or having little energy More than half the days   Poor appetite, weight loss, or overeating Not at all   Feeling bad about yourself - or that you are a failure or have let yourself or your family down Not at all   Trouble concentrating on things such as school, work, reading, or watching TV Nearly every day   Moving or speaking so slowly that other people could have noticed; or the opposite being so fidgety that others notice Nearly every day   Thoughts of being better off dead, or hurting yourself in some way Not at all   PHQ 9 Score 10     Fall Risk Assessment, last 12 mths 5/29/2019   Able to walk? Yes   Fall in past 12 months?  No     ADL Assessment 5/29/2019   Feeding yourself No Help Needed   Getting from bed to chair No Help Needed   Getting dressed No Help Needed   Bathing or showering No Help Needed   Walk across the room (includes cane/walker) No Help Needed   Using the telphone Help Needed   Taking your medications Help Needed   Preparing meals Help Needed   Managing money (expenses/bills) Help Needed   Moderately strenuous housework (laundry) Help Needed   Shopping for personal items (toiletries/medicines) Help Needed   Shopping for groceries Help Needed   Driving Help Needed   Climbing a flight of stairs Help Needed   Getting to places beyond walking distances Help Needed     Mini Mental State Exam 5/29/2019   What is the Year 0   What is the Season 1   What is the Date 0   What is the Day 0   What is the Month 0   Where are we State 0   Where are we Country 0   Where are we [place] or [facility] 0   Where are we Floor 0   Name three objects, then ask the patient to say them 3   Serial sevens Subtract 7 from 100 in increments 0   Ask for the three objects repeated above 0   Name a pencil 0   Name a watch 0   Have the patient repeat this phrase \"No ifs, ands, or buts\" 1   Three stage command: Take the paper in your right hand 1   Fold the paper in half 1   Put the paper on the floor 1   Read and obey the following: CLOSE YOUR EYES 0   Have the patient write a sentence 0   Have the patient copy a figure 0   Mini Mental Score 8   Some recent data might be hidden Objective:     Vitals:    05/29/19 1107   BP: 148/72   Pulse: 65   Resp: 18   Temp: 98.3 °F (36.8 °C)   TempSrc: Oral   SpO2: 97%   Weight: 194 lb (88 kg)   Height: 5' 4\" (1.626 m)     Wt Readings from Last 3 Encounters:   05/29/19 194 lb (88 kg)   10/12/16 212 lb (96.2 kg)     BP Readings from Last 3 Encounters:   05/29/19 148/72   10/24/16 141/66   10/12/16 137/79     Review of Systems   Unable to perform ROS: Dementia     Physical Exam   Constitutional: Vital signs are normal. He appears to not be writhing in pain, not malnourished and not dehydrated. He appears unhealthy. He appears toxic. He does not have a sickly appearance. No distress. Frail, fragile, elderly, , male presents with his wife and 2 sons to establish care due to advancing memory loss. Mr. Gustavo Higgins is responsive to questions but unsure of answers. He has repeat jimmie thoughts, questions, & processing. HENT:   Head: Normocephalic and atraumatic. Right Ear: A foreign body is present. A middle ear effusion is present. Decreased hearing is noted. Left Ear: A foreign body is present. A middle ear effusion is present. Decreased hearing is noted. Nose: Nose normal.   Mouth/Throat: Uvula is midline. Mucous membranes are not pale, dry and not cyanotic. No oral lesions. No uvula swelling. Posterior oropharyngeal erythema present. No posterior oropharyngeal edema. Eyes: Conjunctivae and lids are normal. Lids are everted and swept, no foreign bodies found. Right eye exhibits abnormal extraocular motion. Left eye exhibits abnormal extraocular motion. Left pupil is not reactive. Pupils are unequal.   Left eye is wandering. Reduction in visual acuity on exam. Delayed reaction to light. He is currently on CBD gummies so his pupil could be less reactive to light. Neck: Trachea normal, normal range of motion and full passive range of motion without pain. Neck supple. No hepatojugular reflux and no JVD present. Carotid bruit is present.  No thyromegaly present. Cardiovascular: Regular rhythm, S1 normal, S2 normal, intact distal pulses and normal pulses. Occasional extrasystoles are present. Bradycardia present. Exam reveals distant heart sounds. Exam reveals no gallop and no friction rub. Murmur heard. No extremity edema is present during today's exam.    Pulmonary/Chest: Effort normal and breath sounds normal.   Abdominal: Soft. Normal appearance and bowel sounds are normal. There is no hepatosplenomegaly. There is no tenderness. There is no CVA tenderness. Musculoskeletal:        Lumbar back: He exhibits decreased range of motion and deformity. Neurological: He is alert. He is disoriented. He displays weakness, atrophy, abnormal stance and abnormal reflex. A cranial nerve deficit and sensory deficit is present. He exhibits abnormal muscle tone. Coordination and gait abnormal. GCS score is 15. Skin: Skin is warm, dry and intact. No bruising and no rash noted. He is not diaphoretic. No cyanosis. No pallor. Nails show no clubbing. Psychiatric: His affect is inappropriate. He expresses impulsivity. He exhibits a depressed mood. He is apathetic. He exhibits disordered thought content, abnormal new learning ability, abnormal recent memory and abnormal remote memory. Establishing    Nursing note and vitals reviewed. Disclaimer:   Mr. Johan Cadena has been advised to call or return to our office if symptoms worsen/change/persist. We as a care team including the patient; discussed expected course/resolution/complications of diagnosis in detail today. Medication risks/benefits/costs/interactions/alternatives discussed Johan Cadena was given an after visit summary which includes diagnoses, current medications, & vitals. Johan Cadena expressed understanding with the diagnosis and plan.

## 2019-05-30 LAB
25(OH)D3+25(OH)D2 SERPL-MCNC: 22.4 NG/ML (ref 30–100)
ALBUMIN SERPL-MCNC: 4.5 G/DL (ref 3.5–4.7)
ALBUMIN/CREAT UR: 3.5 MG/G CREAT (ref 0–30)
ALBUMIN/GLOB SERPL: 2 {RATIO} (ref 1.2–2.2)
ALP SERPL-CCNC: 75 IU/L (ref 39–117)
ALT SERPL-CCNC: 10 IU/L (ref 0–44)
AMYLASE SERPL-CCNC: 49 U/L (ref 31–124)
APPEARANCE UR: CLEAR
AST SERPL-CCNC: 18 IU/L (ref 0–40)
BACTERIA #/AREA URNS HPF: ABNORMAL /[HPF]
BASOPHILS # BLD AUTO: 0 X10E3/UL (ref 0–0.2)
BASOPHILS NFR BLD AUTO: 0 %
BILIRUB SERPL-MCNC: 1 MG/DL (ref 0–1.2)
BILIRUB UR QL STRIP: NEGATIVE
BUN SERPL-MCNC: 6 MG/DL (ref 8–27)
BUN/CREAT SERPL: 7 (ref 10–24)
CALCIUM SERPL-MCNC: 9 MG/DL (ref 8.6–10.2)
CASTS URNS QL MICRO: ABNORMAL /LPF
CHLORIDE SERPL-SCNC: 103 MMOL/L (ref 96–106)
CHOLEST SERPL-MCNC: 177 MG/DL (ref 100–199)
CK MB SERPL-MCNC: 2.9 NG/ML (ref 0–10.4)
CK SERPL-CCNC: 77 U/L (ref 24–204)
CO2 SERPL-SCNC: 23 MMOL/L (ref 20–29)
COLOR UR: YELLOW
CREAT SERPL-MCNC: 0.82 MG/DL (ref 0.76–1.27)
CREAT UR-MCNC: 190.8 MG/DL
CRYSTALS URNS MICRO: ABNORMAL
EOSINOPHIL # BLD AUTO: 0.1 X10E3/UL (ref 0–0.4)
EOSINOPHIL NFR BLD AUTO: 1 %
EPI CELLS #/AREA URNS HPF: ABNORMAL /HPF (ref 0–10)
ERYTHROCYTE [DISTWIDTH] IN BLOOD BY AUTOMATED COUNT: 14 % (ref 12.3–15.4)
EST. AVERAGE GLUCOSE BLD GHB EST-MCNC: 108 MG/DL
FERRITIN SERPL-MCNC: 139 NG/ML (ref 30–400)
FOLATE SERPL-MCNC: >20 NG/ML
FT4I SERPL CALC-MCNC: 2 (ref 1.2–4.9)
GLOBULIN SER CALC-MCNC: 2.2 G/DL (ref 1.5–4.5)
GLUCOSE SERPL-MCNC: 93 MG/DL (ref 65–99)
GLUCOSE UR QL: NEGATIVE
HAV IGM SERPL QL IA: NEGATIVE
HBA1C MFR BLD: 5.4 % (ref 4.8–5.6)
HBV CORE IGM SERPL QL IA: NEGATIVE
HBV SURFACE AG SERPL QL IA: NEGATIVE
HCT VFR BLD AUTO: 43.2 % (ref 37.5–51)
HCV AB S/CO SERPL IA: <0.1 S/CO RATIO (ref 0–0.9)
HDLC SERPL-MCNC: 36 MG/DL
HGB BLD-MCNC: 15 G/DL (ref 13–17.7)
HGB UR QL STRIP: NEGATIVE
IMM GRANULOCYTES # BLD AUTO: 0 X10E3/UL (ref 0–0.1)
IMM GRANULOCYTES NFR BLD AUTO: 0 %
IRON SATN MFR SERPL: 29 % (ref 15–55)
IRON SERPL-MCNC: 81 UG/DL (ref 38–169)
KETONES UR QL STRIP: ABNORMAL
LDLC SERPL CALC-MCNC: 118 MG/DL (ref 0–99)
LEUKOCYTE ESTERASE UR QL STRIP: NEGATIVE
LIPASE SERPL-CCNC: 68 U/L (ref 13–78)
LYMPHOCYTES # BLD AUTO: 2 X10E3/UL (ref 0.7–3.1)
LYMPHOCYTES NFR BLD AUTO: 33 %
MAGNESIUM SERPL-MCNC: 2.2 MG/DL (ref 1.6–2.3)
MCH RBC QN AUTO: 31.8 PG (ref 26.6–33)
MCHC RBC AUTO-ENTMCNC: 34.7 G/DL (ref 31.5–35.7)
MCV RBC AUTO: 92 FL (ref 79–97)
MICRO URNS: ABNORMAL
MICRO URNS: ABNORMAL
MICROALBUMIN UR-MCNC: 6.6 UG/ML
MONOCYTES # BLD AUTO: 0.5 X10E3/UL (ref 0.1–0.9)
MONOCYTES NFR BLD AUTO: 8 %
MUCOUS THREADS URNS QL MICRO: PRESENT
NEUTROPHILS # BLD AUTO: 3.6 X10E3/UL (ref 1.4–7)
NEUTROPHILS NFR BLD AUTO: 58 %
NITRITE UR QL STRIP: NEGATIVE
NT-PROBNP SERPL-MCNC: 346 PG/ML (ref 0–486)
PH UR STRIP: 5.5 [PH] (ref 5–7.5)
PLATELET # BLD AUTO: 157 X10E3/UL (ref 150–450)
POTASSIUM SERPL-SCNC: 4.7 MMOL/L (ref 3.5–5.2)
PREALB SERPL-MCNC: 22 MG/DL (ref 9–32)
PROT SERPL-MCNC: 6.7 G/DL (ref 6–8.5)
PROT UR QL STRIP: NEGATIVE
PSA SERPL-MCNC: 3.9 NG/ML (ref 0–4)
RBC # BLD AUTO: 4.72 X10E6/UL (ref 4.14–5.8)
RBC #/AREA URNS HPF: ABNORMAL /HPF (ref 0–2)
REFLEX CRITERIA: NORMAL
SODIUM SERPL-SCNC: 142 MMOL/L (ref 134–144)
SP GR UR: 1.02 (ref 1–1.03)
SPECIMEN STATUS REPORT, ROLRST: NORMAL
T3RU NFR SERPL: 26 % (ref 24–39)
T4 SERPL-MCNC: 7.6 UG/DL (ref 4.5–12)
TESTOST FREE SERPL-MCNC: 6.5 PG/ML (ref 6.6–18.1)
TESTOST SERPL-MCNC: 347 NG/DL (ref 264–916)
TIBC SERPL-MCNC: 276 UG/DL (ref 250–450)
TRIGL SERPL-MCNC: 113 MG/DL (ref 0–149)
TROPONIN I SERPL-MCNC: <0.01 NG/ML (ref 0–0.04)
TSH SERPL DL<=0.005 MIU/L-ACNC: 0.72 UIU/ML (ref 0.45–4.5)
UIBC SERPL-MCNC: 195 UG/DL (ref 111–343)
UNIDENT CRYS URNS QL MICRO: PRESENT
URINALYSIS REFLEX, 377202: ABNORMAL
UROBILINOGEN UR STRIP-MCNC: 0.2 MG/DL (ref 0.2–1)
VIT B12 SERPL-MCNC: 370 PG/ML (ref 232–1245)
VLDLC SERPL CALC-MCNC: 23 MG/DL (ref 5–40)
WBC # BLD AUTO: 6.2 X10E3/UL (ref 3.4–10.8)
WBC #/AREA URNS HPF: ABNORMAL /HPF (ref 0–5)

## 2019-06-04 ENCOUNTER — HOSPITAL ENCOUNTER (OUTPATIENT)
Dept: MRI IMAGING | Age: 79
Discharge: HOME | End: 2019-06-04
Attending: NURSE PRACTITIONER
Payer: MEDICARE

## 2019-06-04 DIAGNOSIS — F32.9 DEPRESSION, REACTIVE: ICD-10-CM

## 2019-06-04 DIAGNOSIS — F03.91 DEMENTIA WITH BEHAVIORAL DISTURBANCE, UNSPECIFIED DEMENTIA TYPE: ICD-10-CM

## 2019-06-04 DIAGNOSIS — R26.0 ATAXIC GAIT: ICD-10-CM

## 2019-06-04 DIAGNOSIS — Z78.9 IMPAIRED DECISION MAKING: ICD-10-CM

## 2019-06-04 DIAGNOSIS — I25.10 CORONARY ARTERY DISEASE DUE TO LIPID RICH PLAQUE: ICD-10-CM

## 2019-06-04 DIAGNOSIS — R41.3 MEMORY LOSS OR IMPAIRMENT: ICD-10-CM

## 2019-06-04 DIAGNOSIS — Z71.89 ENCOUNTER FOR FAMILY CONFERENCE WITH PATIENT PRESENT: ICD-10-CM

## 2019-06-04 DIAGNOSIS — R39.14 BENIGN PROSTATIC HYPERPLASIA WITH INCOMPLETE BLADDER EMPTYING: ICD-10-CM

## 2019-06-04 DIAGNOSIS — Z91.14 NON COMPLIANCE W MEDICATION REGIMEN: ICD-10-CM

## 2019-06-04 DIAGNOSIS — Z78.9 POOR HISTORIAN: ICD-10-CM

## 2019-06-04 DIAGNOSIS — Z91.89 AT HIGH RISK FOR ALTERED FAMILY DYNAMICS: ICD-10-CM

## 2019-06-04 DIAGNOSIS — I10 ESSENTIAL HYPERTENSION: ICD-10-CM

## 2019-06-04 DIAGNOSIS — H61.23 IMPACTED CERUMEN OF BOTH EARS: ICD-10-CM

## 2019-06-04 DIAGNOSIS — R41.0 CONFUSION AND DISORIENTATION: ICD-10-CM

## 2019-06-04 DIAGNOSIS — R41.89 COGNITIVE DEFICITS: ICD-10-CM

## 2019-06-04 DIAGNOSIS — N40.1 BENIGN PROSTATIC HYPERPLASIA WITH INCOMPLETE BLADDER EMPTYING: ICD-10-CM

## 2019-06-04 DIAGNOSIS — F44.89 DISSOCIATIVE CONFUSION: ICD-10-CM

## 2019-06-04 DIAGNOSIS — Z78.9 MEDICATION INTOLERANCE: ICD-10-CM

## 2019-06-04 DIAGNOSIS — C44.311 BASAL CELL CARCINOMA (BCC) OF SKIN OF NOSE: ICD-10-CM

## 2019-06-04 DIAGNOSIS — F03.92 HALLUCINATIONS DUE TO LATE ONSET DEMENTIA: ICD-10-CM

## 2019-06-04 DIAGNOSIS — R45.87 IMPULSIVENESS: ICD-10-CM

## 2019-06-04 DIAGNOSIS — I25.83 CORONARY ARTERY DISEASE DUE TO LIPID RICH PLAQUE: ICD-10-CM

## 2019-06-04 PROCEDURE — 70551 MRI BRAIN STEM W/O DYE: CPT

## 2019-06-04 NOTE — PROGRESS NOTES
These results need to be discussed at follow up. Moderate to severe temporal parietal predominant cerebral atrophy. Mild chronic microvascular ischemic change. No evidence of acute hydrocephalus. No intracranial mass, hemorrhage or evidence of acute infarction.

## 2019-06-11 ENCOUNTER — TELEPHONE (OUTPATIENT)
Dept: GERIATRIC MEDICINE | Age: 79
End: 2019-06-11

## 2019-06-12 ENCOUNTER — OFFICE VISIT (OUTPATIENT)
Dept: GERIATRIC MEDICINE | Age: 79
End: 2019-06-12

## 2019-06-12 VITALS
OXYGEN SATURATION: 97 % | HEART RATE: 64 BPM | BODY MASS INDEX: 33.12 KG/M2 | DIASTOLIC BLOOD PRESSURE: 70 MMHG | WEIGHT: 194 LBS | RESPIRATION RATE: 18 BRPM | HEIGHT: 64 IN | SYSTOLIC BLOOD PRESSURE: 146 MMHG

## 2019-06-12 DIAGNOSIS — I25.10 CORONARY ARTERY DISEASE DUE TO LIPID RICH PLAQUE: ICD-10-CM

## 2019-06-12 DIAGNOSIS — F02.818 ALZHEIMER'S DEMENTIA, LATE ONSET, WITH BEHAVIORAL DISTURBANCE (HCC): Primary | ICD-10-CM

## 2019-06-12 DIAGNOSIS — Z71.89 ACP (ADVANCE CARE PLANNING): ICD-10-CM

## 2019-06-12 DIAGNOSIS — F03.C0 ADVANCED DEMENTIA: ICD-10-CM

## 2019-06-12 DIAGNOSIS — Z00.00 MEDICARE ANNUAL WELLNESS VISIT, SUBSEQUENT: ICD-10-CM

## 2019-06-12 DIAGNOSIS — F05 SUNDOWNING: ICD-10-CM

## 2019-06-12 DIAGNOSIS — N40.0 BENIGN PROSTATIC HYPERPLASIA WITH ELEVATED PROSTATE SPECIFIC ANTIGEN (PSA): ICD-10-CM

## 2019-06-12 DIAGNOSIS — F32.9 REACTIVE DEPRESSION: ICD-10-CM

## 2019-06-12 DIAGNOSIS — F03.918: ICD-10-CM

## 2019-06-12 DIAGNOSIS — Z71.89 ENCOUNTER FOR FAMILY CONFERENCE WITH PATIENT PRESENT: ICD-10-CM

## 2019-06-12 DIAGNOSIS — G93.0 PERIVENTRICULAR CYST: ICD-10-CM

## 2019-06-12 DIAGNOSIS — E55.9 VITAMIN D DEFICIENCY: ICD-10-CM

## 2019-06-12 DIAGNOSIS — R97.20 BENIGN PROSTATIC HYPERPLASIA WITH ELEVATED PROSTATE SPECIFIC ANTIGEN (PSA): ICD-10-CM

## 2019-06-12 DIAGNOSIS — I25.83 CORONARY ARTERY DISEASE DUE TO LIPID RICH PLAQUE: ICD-10-CM

## 2019-06-12 DIAGNOSIS — G30.1 ALZHEIMER'S DEMENTIA, LATE ONSET, WITH BEHAVIORAL DISTURBANCE (HCC): Primary | ICD-10-CM

## 2019-06-12 RX ORDER — ROSUVASTATIN CALCIUM 5 MG/1
5 TABLET, COATED ORAL
Qty: 60 TAB | Refills: 1 | Status: SHIPPED | OUTPATIENT
Start: 2019-06-12

## 2019-06-12 RX ORDER — ERGOCALCIFEROL 1.25 MG/1
50000 CAPSULE ORAL
Qty: 12 CAP | Refills: 1 | Status: SHIPPED | OUTPATIENT
Start: 2019-06-12

## 2019-06-12 RX ORDER — BUSPIRONE HYDROCHLORIDE 5 MG/1
TABLET ORAL
Qty: 120 TAB | Refills: 1 | Status: SHIPPED | OUTPATIENT
Start: 2019-06-12 | End: 2019-07-16

## 2019-06-12 RX ORDER — LORAZEPAM 0.5 MG/1
TABLET ORAL
Qty: 60 TAB | Refills: 0 | Status: SHIPPED | OUTPATIENT
Start: 2019-06-12 | End: 2019-07-16

## 2019-06-12 RX ORDER — TERAZOSIN 1 MG/1
1 CAPSULE ORAL
Qty: 60 CAP | Refills: 1 | Status: SHIPPED | OUTPATIENT
Start: 2019-06-12

## 2019-06-12 RX ORDER — ASPIRIN 81 MG/1
162 TABLET ORAL DAILY
Qty: 120 TAB | Refills: 1 | Status: SHIPPED | OUTPATIENT
Start: 2019-06-12

## 2019-06-12 NOTE — PATIENT INSTRUCTIONS
Alzheimer's Disease: Care Instructions  Your Care Instructions    Alzheimer's disease is a type of dementia. It causes memory loss and affects judgment, language, and behavior. You may have trouble making decisions or may get lost in places that you used to know well. Alzheimer's disease is different than mild memory loss that occurs with aging. It is not clear what causes Alzheimer's disease, but it is the most common form of dementia in older adults. Finding out that you have this disease is a shock. You may be afraid and worried about how the condition will change your life. Although there is no cure at this time, medicine in some cases may slow memory loss for a while. Other medicines may be able to help you sleep or cope with depression and behavior changes. Alzheimer's disease is different for everyone. It may take many years to develop. In some cases, people can function well for a long time. In the early stage of the disease, you can do things at home to make life easier and safer. You also can keep doing your hobbies and other activities. Many people find comfort in planning now for their future needs. Follow-up care is a key part of your treatment and safety. Be sure to make and go to all appointments, and call your doctor if you are having problems. It's also a good idea to know your test results and keep a list of the medicines you take. How can you care for yourself at home? Taking care of yourself  · If your doctor gives you medicines, take them exactly as prescribed. Call your doctor if you think you are having a problem with your medicine. You will get more details on the medicines your doctor prescribes. · Eat a balanced diet. Get plenty of whole grains, fruits, and vegetables every day. If you are not hungry at mealtimes, eat snacks at midmorning and in the afternoon. Try drinks such as Boost, Ensure, or Sustacal if you are having trouble keeping your weight up. · Stay active.  Exercise such as walking may slow the decline of your mental abilities. Try to stay active mentally too. Read and work crossword puzzles if you enjoy these activities. · If you have trouble sleeping, do not nap during the day. Get regular exercise (but not within several hours of bedtime). Drink a glass of warm milk or caffeine-free herbal tea before going to bed. · Ask your doctor about support groups and other resources in your area. They can help people who have Alzheimer's disease and their families. · Be patient. You may find that a task takes you longer than it used to. · If you have not already done so, make a list of advance directives. Advance directives are instructions to your doctor and family members about what kind of care you want if you become unable to speak or express yourself. Talk to a  about making a will, if you do not already have one. Keeping schedules  · Develop a routine. You will feel less frustrated or confused if you have a clear, simple plan of what to do every day. ? Make lists of your medicines and when to take them. ? Write down appointments and other tasks in a calendar. ? Put sticky notes around the house to help you remember events and other things you have to do. ? Schedule activities and tasks for times of the day when you are best able to handle them. Staying safe  · Tell someone when you are going out and where you are going. Let the person know when you will be back. Before you go out alone, write down where you are going, how to get there, and how to get back home. Do this even if you have gone there many times before. Take someone along with you when possible. · Make your home safe. Tack down rugs, put no-slip tape in the tub, use handrails, and put safety switches on stoves and appliances. · Have a family member or other caregiver tell you whether you are driving badly. Deciding to stop driving is very hard for many people. Driving helps you feel independent.  Your state 's license bureau can do a driving test if there is any question. Plan for other means of getting around when you are no longer able to drive. · Use strong lighting, especially at night. Put night-lights in bedrooms, hallways, and bathrooms. · Lower the hot water temperature setting to 120°F or lower to avoid burns. When should you call for help? Call 911 anytime you think you may need emergency care. For example, call if:    · You are lost and do not know whom to call.     · You are injured and do not know whom to call.    Call your doctor now or seek immediate medical care if:    · Your symptoms suddenly get much worse.    Watch closely for changes in your health, and be sure to contact your doctor if:    · You want more information about how you can take care of yourself. Where can you learn more? Go to http://sheridanGreenlingamaris.info/. Enter Y179 in the search box to learn more about \"Alzheimer's Disease: Care Instructions. \"  Current as of: September 11, 2018  Content Version: 11.9  © 8271-7086 [employer]. Care instructions adapted under license by [employer] (which disclaims liability or warranty for this information). If you have questions about a medical condition or this instruction, always ask your healthcare professional. [address] any warranty or liability for your use of this information. Dementia: Care Instructions  Your Care Instructions    Dementia is a loss of mental skills that affects your daily life. It is different than the occasional trouble with memory that is part of aging. You may find it hard to remember things that you feel you should be able to remember. Or you may feel that your mind is just not working as well as usual.  Finding out that you have dementia is a shock. You may be afraid and worried about how the condition will change your life.  Although there is no cure at this time, medicine may slow memory loss and improve thinking for a while. Other medicines may be able to help you sleep or cope with depression and behavior changes. Dementia often gets worse slowly. But it can get worse quickly. As dementia gets worse, it may become harder to do common things that take planning, like making a list and going shopping. Over time, the disease may make it hard for you to take care of yourself. Some people with dementia need others to help care for them. Dementia is different for everyone. You may be able to function well for a long time. In the early stage of the condition, you can do things at home to make life easier and safer. You also can keep doing your hobbies and other activities. Many people find comfort in planning now for their future needs. Follow-up care is a key part of your treatment and safety. Be sure to make and go to all appointments, and call your doctor if you are having problems. It's also a good idea to know your test results and keep a list of the medicines you take. How can you care for yourself at home? · Take your medicines exactly as prescribed. Call your doctor if you think you are having a problem with your medicine. · Eat healthy foods. Eat lots of whole grains, fruits, and vegetables every day. If you are not hungry, try snacks or nutritional drinks such as Boost, Ensure, or Sustacal.  · If you have problems sleeping:  ? Try not to nap too close to your bedtime. ? Exercise regularly. Walking is a good choice. ? Try a glass of warm milk or caffeine-free herbal tea before bed. · Do tasks and activities during the time of day when you feel your best. It may help to develop a daily routine. · Post labels, lists, and sticky notes to help you remember things. Write your activities on a calendar you can easily find. Put your clock where you can easily see it. · Stay active. Take walks in familiar places, or with friends or loved ones. Try to stay active mentally too.  Read and work crossword puzzles if you enjoy these activities. · Do not drive unless you can pass an on-road driving test. If you are not sure if you are safe to drive, your state 's license bureau can test you. · Keep a cordless phone and a flashlight with new batteries by your bed. If possible, put a phone in each of the main rooms of your house, or carry a cell phone in case you fall and cannot reach a phone. Or, you can wear a device around your neck or wrist. You push a button that sends a signal for help. Acknowledge your emotions and plan for the future  · Talk openly and honestly with your doctor. · Let yourself grieve. It is common to feel angry, scared, frustrated, anxious, or depressed. · Get emotional support from family, friends, a support group, or a counselor experienced in working with people who have dementia. · Ask for help if you need it. · Plan for the future. ? Talk to your family and doctor about preparing a living will and other important papers while you can make decisions. These papers tell your doctors how to care for you at the end of your life. ? Consider naming a person to make decisions about your care if you are not able to. When should you call for help? Call 911 anytime you think you may need emergency care. For example, call if:    · You are lost and do not know whom to call.     · You are injured and do not know whom to call.    Call your doctor now or seek immediate medical care if:    · You are more confused or upset than usual.     · You feel like you could hurt yourself because your mind is not working well.   Oliverio Mar closely for changes in your health, and be sure to contact your doctor if you have any problems. Where can you learn more? Go to http://sheridan-amaris.info/. Enter S969 in the search box to learn more about \"Dementia: Care Instructions. \"  Current as of: September 11, 2018  Content Version: 11.9  © 3632-6427 [employer]. Care instructions adapted under license by [employer]) (which disclaims liability or warranty for this information). If you have questions about a medical condition or this instruction, always ask your healthcare professional. [address] any warranty or liability for your use of this information.

## 2019-06-12 NOTE — PROGRESS NOTES
ADVISED PATIENT OF THE FOLLOWING HEALTH MAINTAINCE DUE  Health Maintenance Due   Topic Date Due    DTaP/Tdap/Td series (1 - Tdap) 01/20/1960    GLAUCOMA SCREENING Q2Y  01/20/2004    Pneumococcal 65+ years (1 of 2 - PCV13) 01/20/2004    MEDICARE YEARLY EXAM  03/20/2018      Chief Complaint   Patient presents with    Results     Here to discuss MRI results. 1. Have you been to the ER, urgent care clinic since your last visit? Hospitalized since your last visit? No    2. Have you seen or consulted any other health care providers outside of the [address] [place] [address] since your last visit? Include any DEXA scan, mammography  or colon screening. No    3. Do you have an Advance Directive on file? no    4. Do you have a DNR on file? Full    Patient is accompanied by self, son and wife I have received verbal consent from Aaron CannonCaro Hector to discuss any/all medical information while they are present in the room. Advance Care Planning 10/12/2016   Primary Decision Maker Name Gilford Mourning, [place]   Primary Decision Maker Phone Number [phone]   Primary Decision Maker Relationship to Patient Spouse   Confirm Advance Directive None   Patient Would Like to Complete Advance Directive No         [id number], [address] [place] [address]  Phone: [phone] Fax: [phone]        Patient reminded during visit to bring all medication bottles, OTC medications to all appointments.

## 2019-06-12 NOTE — LETTER
Karen Yousif 
[phone] AFTER VISIT INSTRUCTIONS/PLAN OF CARE 19 Your prescriptions have been electronically sent to the pharmacy you selected: [id number] Mylene Alford 
[address] [place] [address] Phone: [phone] Fax: [phone] If you do not receive your medications it is your responsibility to contact the pharmacy directly. - Continue with the vitamins daily, twice daily. Orders Placed This Encounter  busPIRone (BUSPAR) 5 mg tablet    (treats , after noon behaviors) Si tablet by mouth at breakfast & dinner daily. Do not stop this medication. Dispense:  120 Tab Refill:  1  
 LORazepam (ATIVAN) 0.5 mg tablet Si tab by mouth daily at 4 pm, if needed may give 1/2 tab as needed for behaviors up to 2 whole pills daily. Dispense:  60 Tab Refill:  0  
 ergocalciferol (ERGOCALCIFEROL) 50,000 unit capsule Sig: Take 1 Cap by mouth every seven (7) days. Indications: Vitamin D Deficiency (High Dose Therapy) Dispense:  12 Cap Refill:  1  rosuvastatin (CRESTOR) 5 mg tablet   (cholesterol medication to prevent further damage to the brain from the microvascular disease) Keeps cholesterol lower. Sig: Take 1 Tab by mouth nightly. Dispense:  60 Tab Refill:  1  
 terazosin (HYTRIN) 1 mg capsule   (enlarged prostate) Sig: Take 1 Cap by mouth daily (with breakfast). Dispense:  60 Cap Refill:  1  
 aspirin delayed-release 81 mg tablet  (prevent strokes) Sig: Take 2 Tabs by mouth daily. Dispense:  120 Tab Refill:  1 Return Visit/Follow Up:   
Return in 1 month for follow up, if you need to report behaviors please let me know if we need to adjust any of the therapies we are doing. We appreciate you allowing us to participate in your health care.  Any questions do not hesitate to call [facility] at [phone].

## 2019-06-20 ENCOUNTER — TELEPHONE (OUTPATIENT)
Dept: GERIATRIC MEDICINE | Age: 79
End: 2019-06-20

## 2019-06-20 NOTE — TELEPHONE ENCOUNTER
I spoke with Twila Osman and advised him that the appt was canceled and his father did not need to have this testing done.

## 2019-06-20 NOTE — TELEPHONE ENCOUNTER
Please call Sloan Henriquez he left a voicemail he said he was informed that his dad has a appointment at [facility] today but they were unaware so he wants to verify with you if they should go or not.

## 2019-06-30 PROBLEM — N40.0 BENIGN PROSTATIC HYPERPLASIA WITH ELEVATED PROSTATE SPECIFIC ANTIGEN (PSA): Status: ACTIVE | Noted: 2019-06-30

## 2019-06-30 PROBLEM — F05 SUNDOWNING: Status: ACTIVE | Noted: 2019-06-30

## 2019-06-30 PROBLEM — I25.10 CORONARY ARTERY DISEASE DUE TO LIPID RICH PLAQUE: Status: ACTIVE | Noted: 2019-06-30

## 2019-06-30 PROBLEM — F03.C0 ADVANCED DEMENTIA: Status: ACTIVE | Noted: 2019-06-30

## 2019-06-30 PROBLEM — F02.818 ALZHEIMER'S DEMENTIA, LATE ONSET, WITH BEHAVIORAL DISTURBANCE (HCC): Status: ACTIVE | Noted: 2019-06-30

## 2019-06-30 PROBLEM — F32.9 REACTIVE DEPRESSION: Status: ACTIVE | Noted: 2019-06-30

## 2019-06-30 PROBLEM — F03.918: Status: ACTIVE | Noted: 2019-06-30

## 2019-06-30 PROBLEM — G30.1 ALZHEIMER'S DEMENTIA, LATE ONSET, WITH BEHAVIORAL DISTURBANCE (HCC): Status: ACTIVE | Noted: 2019-06-30

## 2019-06-30 PROBLEM — E55.9 VITAMIN D DEFICIENCY: Status: ACTIVE | Noted: 2019-06-30

## 2019-06-30 PROBLEM — I25.83 CORONARY ARTERY DISEASE DUE TO LIPID RICH PLAQUE: Status: ACTIVE | Noted: 2019-06-30

## 2019-06-30 PROBLEM — G93.0: Status: ACTIVE | Noted: 2019-06-30

## 2019-06-30 PROBLEM — R97.20 BENIGN PROSTATIC HYPERPLASIA WITH ELEVATED PROSTATE SPECIFIC ANTIGEN (PSA): Status: ACTIVE | Noted: 2019-06-30

## 2019-06-30 NOTE — PROGRESS NOTES
Reason For Visit:     Chief Complaint   Patient presents with    Results     Here to discuss MRI results. Ludivina Peguero is a [de-identified] y.o. male who presents for an annual Medicare Wellness Visit. 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 Current Outpatient Medications on File Prior to Visit   Medication Sig Dispense Refill    amLODIPine (NORVASC) 5 mg tablet Take 5 mg by mouth daily. No current facility-administered medications on file prior to visit. No Known Allergies  Patient Care Team:  Donovan Solorzano MD as PCP - General (Internal Medicine)  Pino Betancourt NP as Consulting Provider (Nurse Practitioner)  Brianda Puga MD as Physician (Cardiology)  Objective:     Visit Vitals  /70 (BP 1 Location: Left arm, BP Patient Position: Sitting)   Pulse 64   Resp 18   Ht 5' 4\" (1.626 m)   Wt 194 lb (88 kg)   SpO2 97%   BMI 33.30 kg/m²    Body mass index is 33.3 kg/m². Last Weight Metrics:  Weight Loss Metrics 6/12/2019 5/29/2019 10/12/2016   Today's Wt 194 lb 194 lb 212 lb   BMI 33.3 kg/m2 33.3 kg/m2 34.74 kg/m2     No physical exam was performed today per Medicare Wellness Guidelines.    Health Maintenance:   Daily Aspirin: recommended to start daily 81mg   Immunizations: stated as up to date, no records available  Health Maintenance reviewed   Health Maintenance Due   Topic Date Due    DTaP/Tdap/Td series (1 - Tdap) 01/20/1960    Pneumococcal 65+ years (1 of 2 - PCV13) 01/20/2004    MEDICARE YEARLY EXAM  03/20/2018      Functional Assessment:   ALCOHOL SCREENING:   How often do you have a drink containing alcohol: Never  How many drinks do you have on a typical day when you are drinking: None  How often do you have 6 or more drinks on one occasion: Never  How often during the last year have you found that you were not able to stop drinking once you had started: Never  How often during the last year have you failed to do what was normally expected from you because of drinking: Never  How often during the last year have you needed a first drink in the morning to get yourself going after a heavy drinking session: Never  How often during the last year have you had a feeling of guilt or remorse after drinking: Never  How often during the last year have you been unable to remember what happened the night before because you had been drinking: Never  Have you or someone else been injured as a result of your drinking: Never  Have you or someone else been injured as a result of your drinking: Never  Has a relative or friend, or a doctor or other health worker been concerned about your drinking or suggested you cut down: Never  AUDIT Score: 0     ADL FUNCTIONALITY  ADL Assessment 6/30/2019   Feeding yourself Help Needed   Getting from bed to chair Help Needed   Getting dressed Help Needed   Bathing or showering Help Needed   Walk across the room (includes cane/walker) Help Needed   Using the telphone Help Needed   Taking your medications Help Needed   Preparing meals Help Needed   Managing money (expenses/bills) Help Needed   Moderately strenuous housework (laundry) Help Needed   Shopping for personal items (toiletries/medicines) Help Needed   Shopping for groceries Help Needed   Driving Help Needed   Climbing a flight of stairs Help Needed   Getting to places beyond walking distances Help Needed     DEPRESSION SCREENING:   3 most recent PHQ Screens 6/30/2019   Little interest or pleasure in doing things -   Feeling down, depressed, irritable, or hopeless -   Total Score PHQ 2 -   Trouble falling or staying asleep, or sleeping too much -   Feeling tired or having little energy -   Poor appetite, weight loss, or overeating -   Feeling bad about yourself - or that you are a failure or have let yourself or your family down -   Trouble concentrating on things such as school, work, reading, or watching TV -   Moving or speaking so slowly that other people could have noticed; or the opposite being so fidgety that others notice -   Thoughts of being better off dead, or hurting yourself in some way -   PHQ 9 Score -   How difficult have these problems made it for you to do your work, take care of your home and get along with others Extremely difficult      [address] [facility] [address] Exam 6/30/2019 5/29/2019   What is the Year 0 0   What is the Season 0 1   What is the Date 0 0   What is the Day 0 0   What is the Month 0 0   Where are we State 1 0   Where are we Country 1 0   Where are we [place] or City 0 0   Where are we Floor 0 0   Name three objects, then ask the patient to say them 0 3   Serial sevens Subtract 7 from 100 in increments 0 0   Ask for the three objects repeated above 0 0   Name a pencil 1 0   Name a watch 1 0   Have the patient repeat this phrase \"No ifs, ands, or buts\" 0 1   Three stage command: Take the paper in your right hand 0 1   Fold the paper in half 0 1   Put the paper on the floor 0 1   Read and obey the following: CLOSE YOUR EYES 0 0   Have the patient write a sentence 0 0   Have the patient copy a figure 0 0   Mini Mental Score 4 8   Some recent data might be hidden     FALL RISK:   Fall Risk Assessment, last 12 mths 6/12/2019   Able to walk? Yes   Fall in past 12 months?  No      ABUSE SCREEN:   Abuse Screening Questionnaire 6/30/2019   Do you ever feel afraid of your partner? N   Are you in a relationship with someone who physically or mentally threatens you? N   Is it safe for you to go home? Y      HEARING SCREEN:The patient needs further evaluation. NUTRITION SCREEN: healthy eater  What are the patient's living arrangements - the patient lives with their spouse. This patient resides in [place] [id number] on the [place] [facility]. Does the patient use any ambulatory aids: no If so, what type: None   Does the patient exhibit a steady gait?  no    Is the patient self reliant?  (ie can do own laundry, meals, household chores)  no    Does the patient handle his/her own medications?  no   Does the patient handle his/her own money? no   Is the patients home safe (ie good lighting, handrails on stairs and bath, etc.)? yes   Did you notice or did patient express any hearing difficulties? yes   Did you notice or did patient express any vision difficulties? yes   Were distance and reading eye charts used? yes     Advance Care Planning & Durable Do Not Resuscitate :      Advance Care Planning 6/30/2019   Patient's Healthcare Decision Maker is: Legal Next of Kin   Primary Decision Maker Name -   Primary Decision Maker Phone Number -   Primary Decision Maker Relationship to Patient -   Confirm Advance Directive Yes, not on file   Patient Would Like to Complete Advance Directive -   Does the patient have other document types Power of       Date of ACP Conversation: 06/30/19  Location of Conversation: In Office Visit 06/30/19. Persons included in Conversation:  patient and family   Length of ACP Conversation in minutes:  25 minutes  Is the patient deemed incompetent to make his/her health decisions: no  Authorized Decision Maker: Healthcare Agent/Medical Power of  under Advance Directive   Conversation: The following was discussed with the Mr. Herb Petty with time given to answer questions concerning advance care planning:  Understanding of medical condition    Understanding of CPR, goals and expected outcomes, benefits and burdens discussed. Information on CPR success rates provided (e.g. for CPR in hospital, survival to d/c at two weeks is 22%, for chronically ill or elderly/frail survival is less than 3%); Individual asked to communicate understanding of information in his/her own words. Explored fears and concerns regarding CPR or possible outcomes   Provided ACP educational materials: Understanding Advance Directives by 2025 Annie Torres (www. [email or web address])    Recommended completion of Advance Directive form after review of ACP materials and conversation with prospective healthcare agent   Referral made for ACP follow-up assistance to:    Recommended communicating the plan and making copies for the healthcare agent, personal physician, and others as appropriate (e.g., health system)  Recommended review of completed ACP document annually or upon change in health status     Treatment Plan: The following medication/treatments have been discontinued by the provider today after performing detailed medication reconciliation with patient:   There are no discontinued medications. Disclaimer: This is an [facility]am ([facility]). Patient verbalized understanding and agreement with the plan. A copy of the After Visit Summary with personalized health plan was given to the patient today. Greater than 30 minutes was spent with patient discussing advance directives, wellness initiatives, and preventative measures of their health. I have reviewed the patient's medical history in detail and updated the computerized patient record.        Isabella Rose RN, MSN, [address] [facility]   Acute Care/Adult Gerontology Nurse Practitioner   [facility]   [address] [facility], [address] [place] [address]  [phone] (office)   [phone] (cell)   [phone] (office fax)

## 2019-06-30 NOTE — PROGRESS NOTES
Reason for Visit   Brittanie Rocha is a [de-identified] y.o. male patient who presents today for :  Chief Complaint   Patient presents with    Results     Here to discuss MRI results. Follow Up: Follow-up and Dispositions    · Return in about 1 month (around 7/12/2019) for with the NP for follow up to your treatment plan. Diagnosis/Treatment Plan:    Prolonged face to face time documentation:     Start - 930 a    Stop - 1130 a   Total Face to Face time = 120 mins        Prolonged face to face time necessary for complex assessment of current acute complaints due to geriatric medicine, patient's fragility, frailness, distress, necessary education for new/advanced disease process. Face to Face time with patient reviewing consult notes and plan of care. Review of treatment plan related to systemic illness. Mr. Jenni Cuenca is here with his family of 3 boys and his wife to discuss his MRI results, plan of care. Mr. Jenni Cuenca has now been taking CBD Gummies 10 mg bid. His son tired to increase them to TID but it made his father too \"high\". MRI results are definitive for Confluent periventricular scattered foci of increased T2 signal intensity in the corona radiata. Mucous retention cyst in the right maxillary sinus is present as well. He also has Moderate to severe temporal parietal predominant cerebral atrophy with chronic microvascular ischemic changes present. This conversation required visual aids, discussion of plan of cares, and advance directive, discussed labs and how we can manage his symptoms as he is fairly advanced in his dementia. Discussed options of treatment and how they could treat symptoms versus use of [facility] for the resources and help with navigating this as their father continues to progress in his functional decline. We are going to start the treatment noted below and meet back together for review in 2 weeks. Diagnoses and all orders for this visit:    1.  Alzheimer's dementia, late onset, with behavioral disturbance  -     busPIRone (BUSPAR) 5 mg tablet; 1 tablet by mouth at breakfast & dinner daily. Do not stop this medication.  -     LORazepam (ATIVAN) 0.5 mg tablet; 1 tab by mouth daily at 4 pm, if needed may give 1/2 tab as needed for behaviors up to 2 whole pills daily. -     aspirin delayed-release 81 mg tablet; Take 2 Tabs by mouth daily. 2. Primary senile degenerative dementia, with behavioral disturbance  -     busPIRone (BUSPAR) 5 mg tablet; 1 tablet by mouth at breakfast & dinner daily. Do not stop this medication.  -     LORazepam (ATIVAN) 0.5 mg tablet; 1 tab by mouth daily at 4 pm, if needed may give 1/2 tab as needed for behaviors up to 2 whole pills daily. -     aspirin delayed-release 81 mg tablet; Take 2 Tabs by mouth daily. 3. Advanced dementia  -     busPIRone (BUSPAR) 5 mg tablet; 1 tablet by mouth at breakfast & dinner daily. Do not stop this medication.  -     LORazepam (ATIVAN) 0.5 mg tablet; 1 tab by mouth daily at 4 pm, if needed may give 1/2 tab as needed for behaviors up to 2 whole pills daily. -     aspirin delayed-release 81 mg tablet; Take 2 Tabs by mouth daily. 4. Periventricular cyst  -     busPIRone (BUSPAR) 5 mg tablet; 1 tablet by mouth at breakfast & dinner daily. Do not stop this medication.  -     LORazepam (ATIVAN) 0.5 mg tablet; 1 tab by mouth daily at 4 pm, if needed may give 1/2 tab as needed for behaviors up to 2 whole pills daily. 5. Sundowning  -     busPIRone (BUSPAR) 5 mg tablet; 1 tablet by mouth at breakfast & dinner daily. Do not stop this medication.  -     LORazepam (ATIVAN) 0.5 mg tablet; 1 tab by mouth daily at 4 pm, if needed may give 1/2 tab as needed for behaviors up to 2 whole pills daily. 6. Reactive depression  -     busPIRone (BUSPAR) 5 mg tablet; 1 tablet by mouth at breakfast & dinner daily.  Do not stop this medication.  -     LORazepam (ATIVAN) 0.5 mg tablet; 1 tab by mouth daily at 4 pm, if needed may give 1/2 tab as needed for behaviors up to 2 whole pills daily. 7. Coronary artery disease due to lipid rich plaque  -     rosuvastatin (CRESTOR) 5 mg tablet; Take 1 Tab by mouth nightly. -     aspirin delayed-release 81 mg tablet; Take 2 Tabs by mouth daily. 8. Vitamin D deficiency  -     ergocalciferol (ERGOCALCIFEROL) 50,000 unit capsule; Take 1 Cap by mouth every seven (7) days. Indications: Vitamin D Deficiency (High Dose Therapy)    9. Benign prostatic hyperplasia with elevated prostate specific antigen (PSA)  -     terazosin (HYTRIN) 1 mg capsule; Take 1 Cap by mouth daily (with breakfast). 10. Medicare annual wellness visit, subsequent  -     FULL CODE  -     AR ANNUAL ALCOHOL SCREEN 15 MIN  -     AR  BENEFIT/RISK AREDS FOR MAC DEGENERATION  -     AR MEDICATION LIST DOCUMENTED IN MEDICAL RECORD  -     AR PRESENCE/ABSENCE URINARY INCONTINENCE ASSESSED  -     AR PT FALLS ASSESS DOC 0-1 FALLS W/OUT INJ PAST YR    11. ACP (advance care planning)  -     FULL CODE  -     AR ANNUAL ALCOHOL SCREEN 15 MIN  -     AR  BENEFIT/RISK AREDS FOR MAC DEGENERATION  -     AR MEDICATION LIST DOCUMENTED IN MEDICAL RECORD  -     AR PRESENCE/ABSENCE URINARY INCONTINENCE ASSESSED  -     AR PT FALLS ASSESS DOC 0-1 FALLS W/OUT INJ PAST YR    12. Encounter for family conference with patient present      Discontinued Care: The following treatment modalities have been discontinued by the provider today:   There are no discontinued medications. Subjective:   No Known Allergies  Prior to Admission medications    Medication Sig Start Date End Date Taking? Authorizing Provider   busPIRone (BUSPAR) 5 mg tablet 1 tablet by mouth at breakfast & dinner daily. Do not stop this medication. 6/12/19  Yes Tana Quiroz NP   LORazepam (ATIVAN) 0.5 mg tablet 1 tab by mouth daily at 4 pm, if needed may give 1/2 tab as needed for behaviors up to 2 whole pills daily.  6/12/19  Yes Tana Quiroz NP   ergocalciferol (ERGOCALCIFEROL) 50,000 unit capsule Take 1 Cap by mouth every seven (7) days. Indications: Vitamin D Deficiency (High Dose Therapy) 6/12/19  Yes Rj Lehman NP   rosuvastatin (CRESTOR) 5 mg tablet Take 1 Tab by mouth nightly. 6/12/19  Yes Rj Lehman NP   terazosin (HYTRIN) 1 mg capsule Take 1 Cap by mouth daily (with breakfast). 6/12/19  Yes Rj Lehman NP   aspirin delayed-release 81 mg tablet Take 2 Tabs by mouth daily. 6/12/19  Yes Rj Lehman NP   amLODIPine (NORVASC) 5 mg tablet Take 5 mg by mouth daily.    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    Hypertension 10/12/2016    DENIES ON INTERVIEW, BUT TAKES AMLODIPINE    Ill-defined condition     memory problems    Kidney stones     SURGERY FOR 2014; ANOTHER \"PASSED\"    Memory problem     WIFE MAY HAVE MEMORY ISSUES ALSO     Past Surgical History:   Procedure Laterality Date    HX APPENDECTOMY      HX OTHER SURGICAL      CLOSURE TRAUMATIC AMPUTATION TIP L INDEX FINGER    HX UROLOGICAL      STONE EXTRACTION      Social History     Tobacco Use    Smoking status: Never Smoker    Smokeless tobacco: Never Used   Substance Use Topics    Alcohol use: No    Drug use: No      Family History   Problem Relation Age of Onset    Cancer Father         POSSIBLY THROAT CA (NON-SMOKER)    Arthritis-osteo Brother         PROBLEMS AT A YOUNG AGE-NOT SURE IS RA    Anesth Problems Neg Hx      Advance Care Planning 6/30/2019   Patient's Healthcare Decision Maker is: Legal Next of Kin   Primary Decision Maker Name -   Primary Decision Maker Phone Number -   Primary Decision Maker Relationship to Patient -   Confirm Advance Directive Yes, not on file   Patient Would Like to Complete Advance Directive -   Does the patient have other document types Figure 8 Surgical Results:     Office Visit on 05/29/2019 Component Date Value Ref Range Status    Amylase 05/29/2019 49  31 - 124 U/L Final    Hepatitis A Ab, IgM 05/29/2019 Negative  Negative Final    Hep B surface Ag screen 05/29/2019 Negative  Negative Final    Hep B Core Ab, IgM 05/29/2019 Negative  Negative Final    Hep C Virus Ab 05/29/2019 <0.1  0.0 - 0.9 s/co ratio Final    Comment:                                   Negative:     < 0.8                               Indeterminate: 0.8 - 0.9                                    Positive:     > 0.9   The CDC recommends that a positive HCV antibody result   be followed up with a HCV Nucleic Acid Amplification   test (209114).  TIBC 05/29/2019 276  250 - 450 ug/dL Final    UIBC 05/29/2019 195  111 - 343 ug/dL Final    Iron 05/29/2019 81  38 - 169 ug/dL Final    Iron % saturation 05/29/2019 29  15 - 55 % Final    Ferritin 05/29/2019 139  30 - 400 ng/mL Final    Vitamin B12 05/29/2019 370  232 - 1,245 pg/mL Final    Folate 05/29/2019 >20.0  >3.0 ng/mL Final    Comment: A serum folate concentration of less than 3.1 ng/mL is  considered to represent clinical deficiency.  WBC 05/29/2019 6.2  3.4 - 10.8 x10E3/uL Final    RBC 05/29/2019 4.72  4.14 - 5.80 x10E6/uL Final    HGB 05/29/2019 15.0  13.0 - 17.7 g/dL Final    HCT 05/29/2019 43.2  37.5 - 51.0 % Final    MCV 05/29/2019 92  79 - 97 fL Final    MCH 05/29/2019 31.8  26.6 - 33.0 pg Final    MCHC 05/29/2019 34.7  31.5 - 35.7 g/dL Final    RDW 05/29/2019 14.0  12.3 - 15.4 % Final    PLATELET 26/25/6532 495  150 - 450 x10E3/uL Final                  **Please note reference interval change**    NEUTROPHILS 05/29/2019 58  Not Estab. % Final    Lymphocytes 05/29/2019 33  Not Estab. % Final    MONOCYTES 05/29/2019 8  Not Estab. % Final    EOSINOPHILS 05/29/2019 1  Not Estab. % Final    BASOPHILS 05/29/2019 0  Not Estab. % Final    ABS.  NEUTROPHILS 05/29/2019 3.6  1.4 - 7.0 x10E3/uL Final    Abs Lymphocytes 05/29/2019 2.0  0.7 - 3.1 x10E3/uL Final    ABS. MONOCYTES 05/29/2019 0.5  0.1 - 0.9 x10E3/uL Final    ABS. EOSINOPHILS 05/29/2019 0.1  0.0 - 0.4 x10E3/uL Final    ABS. BASOPHILS 05/29/2019 0.0  0.0 - 0.2 x10E3/uL Final    IMMATURE GRANULOCYTES 05/29/2019 0  Not Estab. % Final    ABS. IMM. GRANS. 05/29/2019 0.0  0.0 - 0.1 x10E3/uL Final    Lipase 05/29/2019 68  13 - 78 U/L Final    Troponin-I, Qt. 05/29/2019 <0.01  0.00 - 0.04 ng/mL Final    Testosterone 05/29/2019 347  264 - 916 ng/dL Final    Comment: Adult male reference interval is based on a population of  healthy nonobese males (BMI <30) between 23and 44years old. [address] [place] [address], [address] [id number]. PMID: [id number].       Free testosterone (Direct) 05/29/2019 6.5* 6.6 - 18.1 pg/mL Final    Hemoglobin A1c 05/29/2019 5.4  4.8 - 5.6 % Final    Comment:          Prediabetes: 5.7 - 6.4           Diabetes: >6.4           Glycemic control for adults with diabetes: <7.0      Estimated average glucose 05/29/2019 108  mg/dL Final    Cholesterol, total 05/29/2019 177  100 - 199 mg/dL Final    Triglyceride 05/29/2019 113  0 - 149 mg/dL Final    HDL Cholesterol 05/29/2019 36* >39 mg/dL Final    VLDL, calculated 05/29/2019 23  5 - 40 mg/dL Final    LDL, calculated 05/29/2019 118* 0 - 99 mg/dL Final    Magnesium 05/29/2019 2.2  1.6 - 2.3 mg/dL Final    Glucose 05/29/2019 93  65 - 99 mg/dL Final    BUN 05/29/2019 6* 8 - 27 mg/dL Final    Creatinine 05/29/2019 0.82  0.76 - 1.27 mg/dL Final    GFR est non-AA 05/29/2019 84  >59 mL/min/1.73 Final    GFR est AA 05/29/2019 97  >59 mL/min/1.73 Final    BUN/Creatinine ratio 05/29/2019 7* 10 - 24 Final    Sodium 05/29/2019 142  134 - 144 mmol/L Final    Potassium 05/29/2019 4.7  3.5 - 5.2 mmol/L Final    Chloride 05/29/2019 103  96 - 106 mmol/L Final    CO2 05/29/2019 23  20 - 29 mmol/L Final    Calcium 05/29/2019 9.0  8.6 - 10.2 mg/dL Final    Protein, total 05/29/2019 6.7  6.0 - 8.5 g/dL Final    Albumin 05/29/2019 4.5  3.5 - 4.7 g/dL Final    GLOBULIN, TOTAL 05/29/2019 2.2  1.5 - 4.5 g/dL Final    A-G Ratio 05/29/2019 2.0  1.2 - 2.2 Final    Bilirubin, total 05/29/2019 1.0  0.0 - 1.2 mg/dL Final    Alk. phosphatase 05/29/2019 75  39 - 117 IU/L Final    AST (SGOT) 05/29/2019 18  0 - 40 IU/L Final    ALT (SGPT) 05/29/2019 10  0 - 44 IU/L Final    Creatinine, urine 05/29/2019 190.8  Not Estab. mg/dL Final    Microalbumin, urine 05/29/2019 6.6  Not Estab. ug/mL Final    Microalb/Creat ratio (ug/mg creat.) 05/29/2019 3.5  0.0 - 30.0 mg/g creat Final    Comment:                      Normal:                0.0 -  30.0                       Albuminuria:          31.0 - 300.0                       Clinical albuminuria:       >300.0      PROBNP 05/29/2019 346  0 - 486 pg/mL Final    Comment: The following cut-points have been suggested for the  use of proBNP for the diagnostic evaluation of heart  failure (HF) in patients with acute dyspnea:  Modality                     Age           Optimal Cut                             (years)            Point  ------------------------------------------------------  Diagnosis (rule in HF)        <50            450 pg/mL                            50 - 75            900 pg/mL                                >75           1800 pg/mL  Exclusion (rule out HF)  Age independent     300 pg/mL      Prealbumin 05/29/2019 22  9 - 32 mg/dL Final    Prostate Specific Ag 05/29/2019 3.9  0.0 - 4.0 ng/mL Final    Comment: Roche ECLIA methodology. According to the American Urological Association, Serum PSA should  decrease and remain at undetectable levels after radical  prostatectomy. The AUA defines biochemical recurrence as an initial  PSA value 0.2 ng/mL or greater followed by a subsequent confirmatory  PSA value 0.2 ng/mL or greater. Values obtained with different assay methods or kits cannot be used  interchangeably.  Results cannot be interpreted as absolute evidence  of the presence or absence of malignant disease.  Reflex Criteria 05/29/2019 Comment   Final    Comment: The percent free PSA is performed on a reflex basis only when the  total PSA is between 4.0 and 10.0 ng/mL.  TSH 05/29/2019 0.718  0.450 - 4.500 uIU/mL Final    T4, Total 05/29/2019 7.6  4.5 - 12.0 ug/dL Final    T3 Uptake 05/29/2019 26  24 - 39 % Final    Free Thyroxine Index (FTI) 05/29/2019 2.0  1.2 - 4.9 Final    Specific Gravity 05/29/2019 1.020  1.005 - 1.030 Final    pH (UA) 05/29/2019 5.5  5.0 - 7.5 Final    Color 05/29/2019 Yellow  Yellow Final    Appearance 05/29/2019 Clear  Clear Final    Leukocyte Esterase 05/29/2019 Negative  Negative Final    Protein 05/29/2019 Negative  Negative/Trace Final    Glucose 05/29/2019 Negative  Negative Final    Ketone 05/29/2019 Trace* Negative Final    Blood 05/29/2019 Negative  Negative Final    Bilirubin 05/29/2019 Negative  Negative Final    Urobilinogen 05/29/2019 0.2  0.2 - 1.0 mg/dL Final    Nitrites 05/29/2019 Negative  Negative Final    Microscopic Examination 05/29/2019 Comment   Final    Microscopic follows if indicated.  Microscopic exam 05/29/2019 See additional order   Final    Microscopic was indicated and was performed.  URINALYSIS REFLEX 05/29/2019 Comment   Final    This specimen will not reflex to a Urine Culture.  VITAMIN D, 25-HYDROXY 05/29/2019 22.4* 30.0 - 100.0 ng/mL Final    Comment: Vitamin D deficiency has been defined by the [facility] practice guideline as a  level of serum 25-OH vitamin D less than 20 ng/mL (1,2). The Endocrine Society went on to further define vitamin D  insufficiency as a level between 21 and 29 ng/mL (2). 1. IOM ([place] of [employer]). 2010. Dietary reference     intakes for calcium and D. [facility]: The     [employer].   2. Oralia MF, Neal SRIVASTAVA, Maria BETANCOURT, et al.     Evaluation, treatment, and prevention of vitamin D     deficiency: an Endocrine Society clinical practice     guideline. JCEM. 2011 Jul; 96(0):1911-30.  Creatine Kinase,Total 05/29/2019 77  24 - 204 U/L Final    Creatine Kinase (CK), MB 05/29/2019 2.9  0.0 - 10.4 ng/mL Final    Color (UA POC) 05/29/2019 Dark Yellow   Final    Clarity (UA POC) 05/29/2019 Turbid   Final    Glucose (UA POC) 05/29/2019 Negative  Negative Final    Bilirubin (UA POC) 05/29/2019 1+  Negative Final    Ketones (UA POC) 05/29/2019 Negative  Negative Final    Specific gravity (UA POC) 05/29/2019 1.020  1.001 - 1.035 Final    Blood (UA POC) 05/29/2019 Negative  Negative Final    pH (UA POC) 05/29/2019 5.0  4.6 - 8.0 Final    Protein (UA POC) 05/29/2019 3+  Negative Final    Urobilinogen (UA POC) 05/29/2019 0.2 mg/dL  0.2 - 1 Final    Nitrites (UA POC) 05/29/2019 Negative  Negative Final    Leukocyte esterase (UA POC) 05/29/2019 Negative  Negative Final    SPECIMEN STATUS REPORT 05/29/2019 COMMENT   Final    Comment: No Test Indicated  A light blue top tube was received with no test indicated  Dear Doctor,  The requisition we received for the above patient has no test  indicated on the request form for one or more of the specimens  submitted. The [place] Code of [employer] requires a  written and signed request be forwarded to the testing laboratory  following the verbal order of a laboratory test.  Date:_________________________________  ICD-9/10 Diagnosis Code(s):___________________________________________  Physician or Authorized Designee Signature:  ______________________________________________________________________  Your signature confirms your order of the test(s) listed  Required test name(s):________________________________________________  Required test number(s):______________________________________________  Please provide requested information and fax to [phone]  to expedite testing.       WBC 05/29/2019 0-5  0 - 5 /hpf Final    RBC 05/29/2019 0-2  0 - 2 /hpf Final    Epithelial cells 05/29/2019 0-10  0 - 10 /hpf Final    Casts 05/29/2019 None seen  None seen /lpf Final    Crystals 05/29/2019 Present* N/A Final    Crystal type 05/29/2019 Calcium Oxalate  N/A Final    Mucus 05/29/2019 Present  Not Estab. Final    Bacteria 05/29/2019 Few  None seen/Few Final     Objective:     Vitals:    06/12/19 1502   BP: 146/70   Pulse: 64   Resp: 18   SpO2: 97%   Weight: 194 lb (88 kg)   Height: 5' 4\" (1.626 m)     Wt Readings from Last 3 Encounters:   06/12/19 194 lb (88 kg)   05/29/19 194 lb (88 kg)   10/12/16 212 lb (96.2 kg)     BP Readings from Last 3 Encounters:   06/12/19 146/70   05/29/19 148/72   10/24/16 141/66     Review of Systems  Physical Exam  Disclaimer:   Mr. Haleigh Lopez has been advised to call or return to our office if symptoms worsen/change/persist. We as a care team including the patient; discussed expected course/resolution/complications of diagnosis in detail today. Medication risks/benefits/costs/interactions/alternatives discussed Haleigh Lopez was given an after visit summary which includes diagnoses, current medications, & vitals. Drew Guzmán expressed understanding with the diagnosis and plan.

## 2019-07-16 ENCOUNTER — OFFICE VISIT (OUTPATIENT)
Dept: GERIATRIC MEDICINE | Age: 79
End: 2019-07-16

## 2019-07-16 VITALS
OXYGEN SATURATION: 97 % | HEART RATE: 64 BPM | RESPIRATION RATE: 18 BRPM | DIASTOLIC BLOOD PRESSURE: 62 MMHG | HEIGHT: 64 IN | SYSTOLIC BLOOD PRESSURE: 130 MMHG | BODY MASS INDEX: 31.76 KG/M2 | WEIGHT: 186 LBS

## 2019-07-16 DIAGNOSIS — F03.918: ICD-10-CM

## 2019-07-16 DIAGNOSIS — F32.9 REACTIVE DEPRESSION: ICD-10-CM

## 2019-07-16 DIAGNOSIS — F03.C0 ADVANCED DEMENTIA: ICD-10-CM

## 2019-07-16 DIAGNOSIS — F02.80 FRONTO-TEMPORAL DEMENTIA (HCC): Primary | ICD-10-CM

## 2019-07-16 DIAGNOSIS — F33.0 DEPRESSION, MAJOR, RECURRENT, MILD (HCC): ICD-10-CM

## 2019-07-16 DIAGNOSIS — G30.1 ALZHEIMER'S DEMENTIA, LATE ONSET, WITH BEHAVIORAL DISTURBANCE (HCC): ICD-10-CM

## 2019-07-16 DIAGNOSIS — I25.10 CORONARY ARTERY DISEASE DUE TO LIPID RICH PLAQUE: ICD-10-CM

## 2019-07-16 DIAGNOSIS — F05 SUNDOWNING: ICD-10-CM

## 2019-07-16 DIAGNOSIS — E55.9 VITAMIN D DEFICIENCY: ICD-10-CM

## 2019-07-16 DIAGNOSIS — G31.09 FRONTO-TEMPORAL DEMENTIA (HCC): Primary | ICD-10-CM

## 2019-07-16 DIAGNOSIS — F02.818 ALZHEIMER'S DEMENTIA, LATE ONSET, WITH BEHAVIORAL DISTURBANCE (HCC): ICD-10-CM

## 2019-07-16 DIAGNOSIS — I25.83 CORONARY ARTERY DISEASE DUE TO LIPID RICH PLAQUE: ICD-10-CM

## 2019-07-16 RX ORDER — OMEPRAZOLE 40 MG/1
40 CAPSULE, DELAYED RELEASE ORAL DAILY
Qty: 90 CAP | Refills: 1 | Status: SHIPPED | OUTPATIENT
Start: 2019-07-16

## 2019-07-16 RX ORDER — LORAZEPAM 0.5 MG/1
TABLET ORAL
Qty: 120 TAB | Refills: 0 | Status: SHIPPED | OUTPATIENT
Start: 2019-07-16

## 2019-07-16 RX ORDER — BUSPIRONE HYDROCHLORIDE 5 MG/1
5 TABLET ORAL
Qty: 120 TAB | Refills: 1 | Status: SHIPPED | OUTPATIENT
Start: 2019-07-16

## 2019-07-16 NOTE — LETTER
7/16/2019 10:21 AM 
 
RE:    [place] [id number] Reason  Current Outpatient Medications Medication Sig Behaviors / anxiety, sundowning  busPIRone (BUSPAR) 5 mg tablet Take 1 Tab by mouth three (3) times daily (with meals). 1 tablet by mouth at breakfast & dinner daily. Do not stop this medication. Prevent Reflux/  omeprazole (PRILOSEC) 40 mg capsule Take 1 Cap by mouth daily. Indications: gastroesophageal reflux disease Behaviors, Memory   OTHER 1 CBD Gummy by mouth up to 4x per day. Anxiety, Behaviors  LORazepam (ATIVAN) 0.5 mg tablet 1 tab by mouth daily at BEDTIME NIGHTLY, if needed may give 1/2 tab as for behaviors up to 3 x daily Low Vitamin D   ergocalciferol (ERGOCALCIFEROL) 50,000 unit capsule Take 1 Cap by mouth every seven (7) days. Indications: Vitamin D Deficiency (High Dose Therapy) Cholesterol/Prevent more Vascular   rosuvastatin (CRESTOR) 5 mg tablet Take 1 Tab by mouth nightly. Enlarged Prostate  terazosin (HYTRIN) 1 mg capsule Take 1 Cap by mouth daily (with breakfast). Prevent Stroke   aspirin delayed-release 81 mg tablet Take 2 Tabs by mouth daily. Blood Pressure Treatment   amLODIPine (NORVASC) 5 mg tablet Take 5 mg by mouth daily.

## 2019-07-16 NOTE — PROGRESS NOTES
ADVISED PATIENT OF THE FOLLOWING HEALTH MAINTAINCE DUE  Health Maintenance Due   Topic Date Due    DTaP/Tdap/Td series (1 - Tdap) 01/20/1961    Pneumococcal 65+ years (1 of 2 - PCV13) 01/20/2004      Chief Complaint   Patient presents with    Follow-up    Altered mental status     Pt having sundowning,     Medication Evaluation     Evaluation of medications. 1. Have you been to the ER, urgent care clinic since your last visit? Hospitalized since your last visit? No    2. Have you seen or consulted any other health care providers outside of the [address] [place] [address] since your last visit? Include any DEXA scan, mammography  or colon screening. No    3. Do you have an Advance Directive on file? yes    4. Do you have a DNR on file? Full    Patient is accompanied by self, son and wife I have received verbal consent from [address] to discuss any/all medical information while they are present in the room. Advance Care Planning 6/30/2019   Patient's Healthcare Decision Maker is: Legal Next of Kin   Primary Decision Maker Name -   Primary Decision Maker Phone Number -   Primary Decision Maker Relationship to Patient -   Confirm Advance Directive Yes, not on file   Patient Would Like to Complete Advance Directive -   Does the patient have other document types Power of          [facility], [address] [facility] [address]  Phone: [phone] Fax: [phone]    Patient reminded during visit to bring all medication bottles, OTC medications to all appointments.        [address] presents for lab draw ordered by Whitney Mcintosh RN MSN [facility]-AG-NP    The following labs were drawn and sent to lab by Haja Cunha LPN:    CMP and Lipid panel, Vitamin D,     The following tubes were sent:    Tiger (1)    Patient tolerated procedure well, blood obtained via venipuncture to right hand

## 2019-07-17 LAB
25(OH)D3+25(OH)D2 SERPL-MCNC: 41.2 NG/ML (ref 30–100)
ALBUMIN SERPL-MCNC: 4.5 G/DL (ref 3.5–4.7)
ALBUMIN/GLOB SERPL: 2.6 {RATIO} (ref 1.2–2.2)
ALP SERPL-CCNC: 72 IU/L (ref 39–117)
ALT SERPL-CCNC: 16 IU/L (ref 0–44)
AST SERPL-CCNC: 23 IU/L (ref 0–40)
BILIRUB SERPL-MCNC: 0.8 MG/DL (ref 0–1.2)
BUN SERPL-MCNC: 6 MG/DL (ref 8–27)
BUN/CREAT SERPL: 8 (ref 10–24)
CALCIUM SERPL-MCNC: 9.2 MG/DL (ref 8.6–10.2)
CHLORIDE SERPL-SCNC: 102 MMOL/L (ref 96–106)
CHOLEST SERPL-MCNC: 122 MG/DL (ref 100–199)
CO2 SERPL-SCNC: 23 MMOL/L (ref 20–29)
CREAT SERPL-MCNC: 0.8 MG/DL (ref 0.76–1.27)
GLOBULIN SER CALC-MCNC: 1.7 G/DL (ref 1.5–4.5)
GLUCOSE SERPL-MCNC: 99 MG/DL (ref 65–99)
HDLC SERPL-MCNC: 41 MG/DL
LDLC SERPL CALC-MCNC: 69 MG/DL (ref 0–99)
POTASSIUM SERPL-SCNC: 4.2 MMOL/L (ref 3.5–5.2)
PROT SERPL-MCNC: 6.2 G/DL (ref 6–8.5)
SODIUM SERPL-SCNC: 141 MMOL/L (ref 134–144)
TRIGL SERPL-MCNC: 59 MG/DL (ref 0–149)
VLDLC SERPL CALC-MCNC: 12 MG/DL (ref 5–40)

## 2019-07-23 ENCOUNTER — TELEPHONE (OUTPATIENT)
Dept: GERIATRIC MEDICINE | Age: 79
End: 2019-07-23

## 2019-07-23 NOTE — PROGRESS NOTES
CMP- stable. BUN is low showing dehydration. Please push oral fluids & ensures for nutrition. Lipid profile- improved. Vitamin D- improved. Continue once weekly treatment. Patient does not need to come in for a visit to discuss these values. Last discussion with family was intending on placing the patient on Hospice due to advancing dementia.

## 2019-07-30 PROBLEM — F33.0 DEPRESSION, MAJOR, RECURRENT, MILD (HCC): Status: ACTIVE | Noted: 2019-07-30

## 2019-07-31 ENCOUNTER — OFFICE VISIT (OUTPATIENT)
Dept: GERIATRIC MEDICINE | Age: 79
End: 2019-07-31

## 2019-07-31 VITALS
SYSTOLIC BLOOD PRESSURE: 142 MMHG | OXYGEN SATURATION: 97 % | HEIGHT: 64 IN | WEIGHT: 179.5 LBS | HEART RATE: 78 BPM | BODY MASS INDEX: 30.64 KG/M2 | RESPIRATION RATE: 18 BRPM | DIASTOLIC BLOOD PRESSURE: 78 MMHG

## 2019-07-31 DIAGNOSIS — E43 PROTEIN-CALORIE MALNUTRITION, SEVERE (HCC): Primary | ICD-10-CM

## 2019-07-31 DIAGNOSIS — F32.9 REACTIVE DEPRESSION: ICD-10-CM

## 2019-07-31 DIAGNOSIS — G31.09 FRONTO-TEMPORAL DEMENTIA (HCC): ICD-10-CM

## 2019-07-31 DIAGNOSIS — F05 SUNDOWNING: ICD-10-CM

## 2019-07-31 DIAGNOSIS — F03.918: ICD-10-CM

## 2019-07-31 DIAGNOSIS — I25.10 CORONARY ARTERY DISEASE DUE TO LIPID RICH PLAQUE: ICD-10-CM

## 2019-07-31 DIAGNOSIS — F02.818 ALZHEIMER'S DEMENTIA, LATE ONSET, WITH BEHAVIORAL DISTURBANCE (HCC): ICD-10-CM

## 2019-07-31 DIAGNOSIS — I25.83 CORONARY ARTERY DISEASE DUE TO LIPID RICH PLAQUE: ICD-10-CM

## 2019-07-31 DIAGNOSIS — G30.1 ALZHEIMER'S DEMENTIA, LATE ONSET, WITH BEHAVIORAL DISTURBANCE (HCC): ICD-10-CM

## 2019-07-31 DIAGNOSIS — F02.80 FRONTO-TEMPORAL DEMENTIA (HCC): ICD-10-CM

## 2019-07-31 DIAGNOSIS — R61 EXCESSIVE SWEATING: ICD-10-CM

## 2019-07-31 DIAGNOSIS — R25.1 SHAKING: ICD-10-CM

## 2019-07-31 LAB — GLUCOSE POC: 110 MG/DL

## 2019-07-31 NOTE — ACP (ADVANCE CARE PLANNING)
Advance Care Planning 6/30/2019   Patient's Healthcare Decision Maker is: Legal Next of Kristal 68   Primary Decision Maker Name Esther Jody - [phone]  gilbert Celina - [phone]  Lavinia Reina - [phone]   Primary Decision Maker Relationship to Patient Sons; wife has cognitive issues as well.     Confirm Advance Directive Yes, not on file   Patient Would Like to Complete Advance Directive NO    Does the patient have other document types Power of [employer]

## 2019-07-31 NOTE — PATIENT INSTRUCTIONS
Alzheimer's Disease: Care Instructions  Your Care Instructions    Alzheimer's disease is a type of dementia. It causes memory loss and affects judgment, language, and behavior. You may have trouble making decisions or may get lost in places that you used to know well. Alzheimer's disease is different than mild memory loss that occurs with aging. It is not clear what causes Alzheimer's disease, but it is the most common form of dementia in older adults. Finding out that you have this disease is a shock. You may be afraid and worried about how the condition will change your life. Although there is no cure at this time, medicine in some cases may slow memory loss for a while. Other medicines may be able to help you sleep or cope with depression and behavior changes. Alzheimer's disease is different for everyone. It may take many years to develop. In some cases, people can function well for a long time. In the early stage of the disease, you can do things at home to make life easier and safer. You also can keep doing your hobbies and other activities. Many people find comfort in planning now for their future needs. Follow-up care is a key part of your treatment and safety. Be sure to make and go to all appointments, and call your doctor if you are having problems. It's also a good idea to know your test results and keep a list of the medicines you take. How can you care for yourself at home? Taking care of yourself  · If your doctor gives you medicines, take them exactly as prescribed. Call your doctor if you think you are having a problem with your medicine. You will get more details on the medicines your doctor prescribes. · Eat a balanced diet. Get plenty of whole grains, fruits, and vegetables every day. If you are not hungry at mealtimes, eat snacks at midmorning and in the afternoon. Try drinks such as Boost, Ensure, or Sustacal if you are having trouble keeping your weight up. · Stay active.  Exercise such as walking may slow the decline of your mental abilities. Try to stay active mentally too. Read and work crossword puzzles if you enjoy these activities. · If you have trouble sleeping, do not nap during the day. Get regular exercise (but not within several hours of bedtime). Drink a glass of warm milk or caffeine-free herbal tea before going to bed. · Ask your doctor about support groups and other resources in your area. They can help people who have Alzheimer's disease and their families. · Be patient. You may find that a task takes you longer than it used to. · If you have not already done so, make a list of advance directives. Advance directives are instructions to your doctor and family members about what kind of care you want if you become unable to speak or express yourself. Talk to a  about making a will, if you do not already have one. Keeping schedules  · Develop a routine. You will feel less frustrated or confused if you have a clear, simple plan of what to do every day. ? Make lists of your medicines and when to take them. ? Write down appointments and other tasks in a calendar. ? Put sticky notes around the house to help you remember events and other things you have to do. ? Schedule activities and tasks for times of the day when you are best able to handle them. Staying safe  · Tell someone when you are going out and where you are going. Let the person know when you will be back. Before you go out alone, write down where you are going, how to get there, and how to get back home. Do this even if you have gone there many times before. Take someone along with you when possible. · Make your home safe. Tack down rugs, put no-slip tape in the tub, use handrails, and put safety switches on stoves and appliances. · Have a family member or other caregiver tell you whether you are driving badly. Deciding to stop driving is very hard for many people. Driving helps you feel independent.  Your state 's license bureau can do a driving test if there is any question. Plan for other means of getting around when you are no longer able to drive. · Use strong lighting, especially at night. Put night-lights in bedrooms, hallways, and bathrooms. · Lower the hot water temperature setting to 120°F or lower to avoid burns. When should you call for help? Call 911 anytime you think you may need emergency care. For example, call if:    · You are lost and do not know whom to call.     · You are injured and do not know whom to call.    Call your doctor now or seek immediate medical care if:    · Your symptoms suddenly get much worse.    Watch closely for changes in your health, and be sure to contact your doctor if:    · You want more information about how you can take care of yourself. Where can you learn more? Go to http://sheridan-amaris.info/. Enter Y179 in the search box to learn more about \"Alzheimer's Disease: Care Instructions. \"  Current as of: September 11, 2018  Content Version: 12.1  © 1404-5264 [employer]. Care instructions adapted under license by [employer] (which disclaims liability or warranty for this information). If you have questions about a medical condition or this instruction, always ask your healthcare professional. [address] any warranty or liability for your use of this information.

## 2019-07-31 NOTE — PROGRESS NOTES
Reason for Visit   Bryanna Greenwood is a [de-identified] y.o. male patient who presents today for :  Chief Complaint   Patient presents with    Follow-up    Altered mental status     Pt having sundowning,     Medication Evaluation     Evaluation of medications. Follow Up: Follow-up and Dispositions    · Return if symptoms worsen or fail to improve. Diagnosis/Treatment Plan:    Mr. Cristian Baker is here with his wife and 2 of 3 sons. He is here for follow up and to discuss how things have been going since the diagnosis of his advancing alzheimer's disease. His sons have been using \"CBD Gummies\" try and control his behaviors more but are having more behaviors in the evenings and weekend. He is also having more difficulty with finding words, walking, and participating in his life. He has lost almost 10 # in 1 month and his family reports he has all but stopped eating. He is not aware of the events that are going on with regard to his memory and function. Discussed with family that they have financial hardships and what are the options for him to continue to function. His disease is progressing quickly and he is loosing weight. I have offered some home care options but family money issues. We discussed hospice in this case as it will give them the resources they need to help with his care and it is covered by Medicare. They have decided to use [facility]. Contact information has been given to Maxi patient's son's. I have also passed the referral to [facility] the rep with [facility]. I was unable to get the family t decide on DDNR. They want to deal with this once they get hospice in. Follow up is based on Mr. Cristian Baker' needs with management. I am here to refill medications and be of any help I can for them during this time. 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  Alzheimer's dementia, late onset, with behavioral disturbance G30.1 331.0 busPIRone (BUSPAR) 5 mg tablet    C26.04 479.02 METABOLIC PANEL, COMPREHENSIVE      COLLECTION VENOUS BLOOD,VENIPUNCTURE      LIPID PANEL      VITAMIN D, 25 HYDROXY      LORazepam (ATIVAN) 0.5 mg tablet   3. Primary senile degenerative dementia, with behavioral disturbance F03.91 294.21 busPIRone (BUSPAR) 5 mg tablet      METABOLIC PANEL, COMPREHENSIVE      COLLECTION VENOUS BLOOD,VENIPUNCTURE      LIPID PANEL      VITAMIN D, 25 HYDROXY      LORazepam (ATIVAN) 0.5 mg tablet   4. Advanced dementia F03.90 294.20 busPIRone (BUSPAR) 5 mg tablet      METABOLIC PANEL, COMPREHENSIVE      COLLECTION VENOUS BLOOD,VENIPUNCTURE      LIPID PANEL      VITAMIN D, 25 HYDROXY      LORazepam (ATIVAN) 0.5 mg tablet   5. Sundowning F05 [id number] busPIRone (BUSPAR) 5 mg tablet      METABOLIC PANEL, COMPREHENSIVE      COLLECTION VENOUS BLOOD,VENIPUNCTURE      LIPID PANEL      VITAMIN D, 25 HYDROXY      LORazepam (ATIVAN) 0.5 mg tablet   6. Reactive depression F32.9 300.4 busPIRone (BUSPAR) 5 mg tablet      METABOLIC PANEL, COMPREHENSIVE      COLLECTION VENOUS BLOOD,VENIPUNCTURE      LIPID PANEL      VITAMIN D, 25 HYDROXY      LORazepam (ATIVAN) 0.5 mg tablet   7. Vitamin D deficiency E55.9 268.9 busPIRone (BUSPAR) 5 mg tablet      METABOLIC PANEL, COMPREHENSIVE      COLLECTION VENOUS BLOOD,VENIPUNCTURE      LIPID PANEL      VITAMIN D, 25 HYDROXY   8. Coronary artery disease due to lipid rich plaque I25.10 414.00 busPIRone (BUSPAR) 5 mg tablet    H92.19 229.9 METABOLIC PANEL, COMPREHENSIVE      COLLECTION VENOUS BLOOD,VENIPUNCTURE      LIPID PANEL      VITAMIN D, 25 HYDROXY   9.  Depression, major, recurrent, mild (HCC) F33.0 296.31         Objective:     Vitals:    07/16/19 0909   BP: 130/62   Pulse: 64   Resp: 18   SpO2: 97%   Weight: 186 lb (84.4 kg)   Height: 5' 4\" (1.626 m)     Wt Readings from Last 3 Encounters:   07/16/19 186 lb (84.4 kg)   06/12/19 194 lb (88 kg)   05/29/19 194 lb (88 kg)     BP Readings from Last 3 Encounters:   07/16/19 130/62   06/12/19 146/70   05/29/19 148/72     Review of Systems   Unable to perform ROS: Dementia     Physical Exam   Constitutional: Vital signs are normal. He appears malnourished. He appears to not be writhing in pain and not dehydrated. He appears unhealthy. He appears toxic. He does not have a sickly appearance. He appears distressed. Frail, fragile, elderly, , male presents with his wife and 2 sons to establish care due to advancing memory loss. Mr. Jean Marie Lord is responsive to questions but unsure of answers. He has repeat jimmie thoughts, questions, & processing. HENT:   Head: Normocephalic and atraumatic. Right Ear: A foreign body is present. A middle ear effusion is present. Decreased hearing is noted. Left Ear: A foreign body is present. A middle ear effusion is present. Decreased hearing is noted. Nose: Nose normal.   Mouth/Throat: Uvula is midline. Mucous membranes are not pale, dry and not cyanotic. No oral lesions. No uvula swelling. Posterior oropharyngeal erythema present. No posterior oropharyngeal edema. Eyes: Conjunctivae and lids are normal. Lids are everted and swept, no foreign bodies found. Right eye exhibits abnormal extraocular motion. Left eye exhibits abnormal extraocular motion. Left pupil is not reactive. Pupils are unequal.   Left eye is wandering. Reduction in visual acuity on exam. Delayed reaction to light. He is currently on CBD gummies so his pupil could be less reactive to light. Neck: Trachea normal, normal range of motion and full passive range of motion without pain. Neck supple. No hepatojugular reflux and no JVD present. Carotid bruit is present. No thyromegaly present. Cardiovascular: Regular rhythm, S1 normal, S2 normal, intact distal pulses and normal pulses. Occasional extrasystoles are present. Bradycardia present. Exam reveals distant heart sounds. Exam reveals no gallop and no friction rub. Murmur heard.   No extremity edema is present during today's exam. Pulmonary/Chest: Effort normal and breath sounds normal.   Abdominal: Soft. Normal appearance and bowel sounds are normal. There is no hepatosplenomegaly. There is no tenderness. There is no CVA tenderness. Musculoskeletal:        Lumbar back: He exhibits decreased range of motion and deformity. Neurological: He is alert. He is disoriented. He displays weakness, atrophy, abnormal stance and abnormal reflex. A cranial nerve deficit and sensory deficit is present. He exhibits abnormal muscle tone. Coordination and gait abnormal. GCS score is 15. Skin: Skin is warm, dry and intact. No bruising and no rash noted. He is not diaphoretic. No cyanosis. No pallor. Nails show no clubbing. Psychiatric: His affect is inappropriate. He expresses impulsivity. He exhibits a depressed mood. He is apathetic. He exhibits disordered thought content, abnormal new learning ability, abnormal recent memory and abnormal remote memory. Establishing    Nursing note and vitals reviewed. Discontinued Care: The following treatment modalities have been discontinued by the provider today:   Medications Discontinued During This Encounter   Medication Reason    busPIRone (BUSPAR) 5 mg tablet     LORazepam (ATIVAN) 0.5 mg tablet      Subjective:   No Known Allergies  Prior to Admission medications    Medication Sig Start Date End Date Taking? Authorizing Provider   busPIRone (BUSPAR) 5 mg tablet Take 1 Tab by mouth three (3) times daily (with meals). 1 tablet by mouth at breakfast & dinner daily. Do not stop this medication. 7/16/19  Yes Kd Solorzano NP   omeprazole (PRILOSEC) 40 mg capsule Take 1 Cap by mouth daily. Indications: gastroesophageal reflux disease 7/16/19  Yes Kd Solorzano NP   OTHER 1 CBD Gummy by mouth up to 4x per day.  7/16/19  Yes Kd Solorzano NP   LORazepam (ATIVAN) 0.5 mg tablet 1 tab by mouth daily at BEDTIME NIGHTLY, if needed may give 1/2 tab as for behaviors up to 3 x daily 7/16/19 Yes Derrick Matta NP   ergocalciferol (ERGOCALCIFEROL) 50,000 unit capsule Take 1 Cap by mouth every seven (7) days. Indications: Vitamin D Deficiency (High Dose Therapy) 6/12/19  Yes Derrick Matta NP   rosuvastatin (CRESTOR) 5 mg tablet Take 1 Tab by mouth nightly. 6/12/19  Yes Derrick Matta NP   terazosin (HYTRIN) 1 mg capsule Take 1 Cap by mouth daily (with breakfast). 6/12/19  Yes Derrick Matta NP   aspirin delayed-release 81 mg tablet Take 2 Tabs by mouth daily. 6/12/19  Yes Derrick Matta NP   amLODIPine (NORVASC) 5 mg tablet Take 5 mg by mouth daily.    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    Hypertension 10/12/2016    DENIES ON INTERVIEW, BUT TAKES AMLODIPINE    Ill-defined condition     memory problems    Kidney stones     SURGERY FOR 2014; ANOTHER \"PASSED\"    Memory problem     WIFE MAY HAVE MEMORY ISSUES ALSO     Past Surgical History:   Procedure Laterality Date    HX APPENDECTOMY      HX OTHER SURGICAL      CLOSURE TRAUMATIC AMPUTATION TIP L INDEX FINGER    HX UROLOGICAL      STONE EXTRACTION      Social History     Tobacco Use    Smoking status: Never Smoker    Smokeless tobacco: Never Used   Substance Use Topics    Alcohol use: No    Drug use: No      Family History   Problem Relation Age of Onset    Cancer Father         POSSIBLY THROAT CA (NON-SMOKER)    Arthritis-osteo Brother         PROBLEMS AT A YOUNG AGE-NOT SURE IS RA    Anesth Problems Neg Hx      Advance Care Planning 6/30/2019   Patient's Healthcare Decision Maker is: Legal Next of Kin   Primary Decision Maker Name -   Primary Decision Maker Phone Number -   Primary Decision Maker Relationship to Patient -   Confirm Advance Directive Yes, not on file   Patient Would Like to Complete Advance Directive -   Does the patient have other document types Power of [employer] Results:     Office Visit on 07/16/2019   Component Date Value Ref Range Status    Glucose 07/16/2019 99  65 - 99 mg/dL Final    BUN 07/16/2019 6* 8 - 27 mg/dL Final    Creatinine 07/16/2019 0.80  0.76 - 1.27 mg/dL Final    GFR est non-AA 07/16/2019 84  >59 mL/min/1.73 Final    GFR est AA 07/16/2019 98  >59 mL/min/1.73 Final    BUN/Creatinine ratio 07/16/2019 8* 10 - 24 Final    Sodium 07/16/2019 141  134 - 144 mmol/L Final    Potassium 07/16/2019 4.2  3.5 - 5.2 mmol/L Final    Chloride 07/16/2019 102  96 - 106 mmol/L Final    CO2 07/16/2019 23  20 - 29 mmol/L Final    Calcium 07/16/2019 9.2  8.6 - 10.2 mg/dL Final    Protein, total 07/16/2019 6.2  6.0 - 8.5 g/dL Final    Albumin 07/16/2019 4.5  3.5 - 4.7 g/dL Final    GLOBULIN, TOTAL 07/16/2019 1.7  1.5 - 4.5 g/dL Final    A-G Ratio 07/16/2019 2.6* 1.2 - 2.2 Final    Bilirubin, total 07/16/2019 0.8  0.0 - 1.2 mg/dL Final    Alk. phosphatase 07/16/2019 72  39 - 117 IU/L Final    AST (SGOT) 07/16/2019 23  0 - 40 IU/L Final    ALT (SGPT) 07/16/2019 16  0 - 44 IU/L Final    Cholesterol, total 07/16/2019 122  100 - 199 mg/dL Final    Triglyceride 07/16/2019 59  0 - 149 mg/dL Final    HDL Cholesterol 07/16/2019 41  >39 mg/dL Final    VLDL, calculated 07/16/2019 12  5 - 40 mg/dL Final    LDL, calculated 07/16/2019 69  0 - 99 mg/dL Final    VITAMIN D, 25-HYDROXY 07/16/2019 41.2  30.0 - 100.0 ng/mL Final    Comment: Vitamin D deficiency has been defined by the [place] of  Medicine and an Endocrine Society practice guideline as a  level of serum 25-OH vitamin D less than 20 ng/mL (1,2). The Endocrine Society went on to further define vitamin D  insufficiency as a level between 21 and 29 ng/mL (2). 1. IOM ([place] of Medicine). 2010. Dietary reference     intakes for calcium and D. [facility]: The     [employer].   2. Oralia URBINA, Neal SRIVASTAVA, Maria BETANCOURT, et al.     Evaluation, treatment, and prevention of vitamin D deficiency: an Endocrine Society clinical practice     guideline. JCEM. 2011 Jul; 96(8):1911-30. Office Visit on 05/29/2019   Component Date Value Ref Range Status    Amylase 05/29/2019 49  31 - 124 U/L Final    Hepatitis A Ab, IgM 05/29/2019 Negative  Negative Final    Hep B surface Ag screen 05/29/2019 Negative  Negative Final    Hep B Core Ab, IgM 05/29/2019 Negative  Negative Final    Hep C Virus Ab 05/29/2019 <0.1  0.0 - 0.9 s/co ratio Final    Comment:                                   Negative:     < 0.8                               Indeterminate: 0.8 - 0.9                                    Positive:     > 0.9   The CDC recommends that a positive HCV antibody result   be followed up with a HCV Nucleic Acid Amplification   test ([id number]).  TIBC 05/29/2019 276  250 - 450 ug/dL Final    UIBC 05/29/2019 195  111 - 343 ug/dL Final    Iron 05/29/2019 81  38 - 169 ug/dL Final    Iron % saturation 05/29/2019 29  15 - 55 % Final    Ferritin 05/29/2019 139  30 - 400 ng/mL Final    Vitamin B12 05/29/2019 370  232 - 1,245 pg/mL Final    Folate 05/29/2019 >20.0  >3.0 ng/mL Final    Comment: A serum folate concentration of less than 3.1 ng/mL is  considered to represent clinical deficiency.  WBC 05/29/2019 6.2  3.4 - 10.8 x10E3/uL Final    RBC 05/29/2019 4.72  4.14 - 5.80 x10E6/uL Final    HGB 05/29/2019 15.0  13.0 - 17.7 g/dL Final    HCT 05/29/2019 43.2  37.5 - 51.0 % Final    MCV 05/29/2019 92  79 - 97 fL Final    MCH 05/29/2019 31.8  26.6 - 33.0 pg Final    MCHC 05/29/2019 34.7  31.5 - 35.7 g/dL Final    RDW 05/29/2019 14.0  12.3 - 15.4 % Final    PLATELET 25/06/1362 781  150 - 450 x10E3/uL Final                  **Please note reference interval change**    NEUTROPHILS 05/29/2019 58  Not Estab. % Final    Lymphocytes 05/29/2019 33  Not Estab. % Final    MONOCYTES 05/29/2019 8  Not Estab. % Final    EOSINOPHILS 05/29/2019 1  Not Estab. % Final    BASOPHILS 05/29/2019 0  Not Estab. % Final    ABS. NEUTROPHILS 05/29/2019 3.6  1.4 - 7.0 x10E3/uL Final    Abs Lymphocytes 05/29/2019 2.0  0.7 - 3.1 x10E3/uL Final    ABS. MONOCYTES 05/29/2019 0.5  0.1 - 0.9 x10E3/uL Final    ABS. EOSINOPHILS 05/29/2019 0.1  0.0 - 0.4 x10E3/uL Final    ABS. BASOPHILS 05/29/2019 0.0  0.0 - 0.2 x10E3/uL Final    IMMATURE GRANULOCYTES 05/29/2019 0  Not Estab. % Final    ABS. IMM. GRANS. 05/29/2019 0.0  0.0 - 0.1 x10E3/uL Final    Lipase 05/29/2019 68  13 - 78 U/L Final    Troponin-I, Qt. 05/29/2019 <0.01  0.00 - 0.04 ng/mL Final    Testosterone 05/29/2019 347  264 - 916 ng/dL Final    Comment: Adult male reference interval is based on a population of  healthy nonobese males (BMI <30) between 23and 44years old. [address] [place] [address], [address] [place] [address] [phone]. PMID: [id number].       Free testosterone (Direct) 05/29/2019 6.5* 6.6 - 18.1 pg/mL Final    Hemoglobin A1c 05/29/2019 5.4  4.8 - 5.6 % Final    Comment:          Prediabetes: 5.7 - 6.4           Diabetes: >6.4           Glycemic control for adults with diabetes: <7.0      Estimated average glucose 05/29/2019 108  mg/dL Final    Cholesterol, total 05/29/2019 177  100 - 199 mg/dL Final    Triglyceride 05/29/2019 113  0 - 149 mg/dL Final    HDL Cholesterol 05/29/2019 36* >39 mg/dL Final    VLDL, calculated 05/29/2019 23  5 - 40 mg/dL Final    LDL, calculated 05/29/2019 118* 0 - 99 mg/dL Final    Magnesium 05/29/2019 2.2  1.6 - 2.3 mg/dL Final    Glucose 05/29/2019 93  65 - 99 mg/dL Final    BUN 05/29/2019 6* 8 - 27 mg/dL Final    Creatinine 05/29/2019 0.82  0.76 - 1.27 mg/dL Final    GFR est non-AA 05/29/2019 84  >59 mL/min/1.73 Final    GFR est AA 05/29/2019 97  >59 mL/min/1.73 Final    BUN/Creatinine ratio 05/29/2019 7* 10 - 24 Final    Sodium 05/29/2019 142  134 - 144 mmol/L Final    Potassium 05/29/2019 4.7  3.5 - 5.2 mmol/L Final    Chloride 05/29/2019 103  96 - 106 mmol/L Final    CO2 05/29/2019 23  20 - 29 mmol/L Final    Calcium 05/29/2019 9.0  8.6 - 10.2 mg/dL Final    Protein, total 05/29/2019 6.7  6.0 - 8.5 g/dL Final    Albumin 05/29/2019 4.5  3.5 - 4.7 g/dL Final    GLOBULIN, TOTAL 05/29/2019 2.2  1.5 - 4.5 g/dL Final    A-G Ratio 05/29/2019 2.0  1.2 - 2.2 Final    Bilirubin, total 05/29/2019 1.0  0.0 - 1.2 mg/dL Final    Alk. phosphatase 05/29/2019 75  39 - 117 IU/L Final    AST (SGOT) 05/29/2019 18  0 - 40 IU/L Final    ALT (SGPT) 05/29/2019 10  0 - 44 IU/L Final    Creatinine, urine 05/29/2019 190.8  Not Estab. mg/dL Final    Microalbumin, urine 05/29/2019 6.6  Not Estab. ug/mL Final    Microalb/Creat ratio (ug/mg creat.) 05/29/2019 3.5  0.0 - 30.0 mg/g creat Final    Comment:                      Normal:                0.0 -  30.0                       Albuminuria:          31.0 - 300.0                       Clinical albuminuria:       >300.0      PROBNP 05/29/2019 346  0 - 486 pg/mL Final    Comment: The following cut-points have been suggested for the  use of proBNP for the diagnostic evaluation of heart  failure (HF) in patients with acute dyspnea:  Modality                     Age           Optimal Cut                             (years)            Point  ------------------------------------------------------  Diagnosis (rule in HF)        <50            450 pg/mL                            50 - 75            900 pg/mL                                >75           1800 pg/mL  Exclusion (rule out HF)  Age independent     300 pg/mL      Prealbumin 05/29/2019 22  9 - 32 mg/dL Final    Prostate Specific Ag 05/29/2019 3.9  0.0 - 4.0 ng/mL Final    Comment: Roche ECLIA methodology. According to the American Urological Association, Serum PSA should  decrease and remain at undetectable levels after radical  prostatectomy. The AUA defines biochemical recurrence as an initial  PSA value 0.2 ng/mL or greater followed by a subsequent confirmatory  PSA value 0.2 ng/mL or greater.   Values obtained with different assay methods or kits cannot be used  interchangeably. Results cannot be interpreted as absolute evidence  of the presence or absence of malignant disease.  Reflex Criteria 05/29/2019 Comment   Final    Comment: The percent free PSA is performed on a reflex basis only when the  total PSA is between 4.0 and 10.0 ng/mL.  TSH 05/29/2019 0.718  0.450 - 4.500 uIU/mL Final    T4, Total 05/29/2019 7.6  4.5 - 12.0 ug/dL Final    T3 Uptake 05/29/2019 26  24 - 39 % Final    Free Thyroxine Index (FTI) 05/29/2019 2.0  1.2 - 4.9 Final    Specific Gravity 05/29/2019 1.020  1.005 - 1.030 Final    pH (UA) 05/29/2019 5.5  5.0 - 7.5 Final    Color 05/29/2019 Yellow  Yellow Final    Appearance 05/29/2019 Clear  Clear Final    Leukocyte Esterase 05/29/2019 Negative  Negative Final    Protein 05/29/2019 Negative  Negative/Trace Final    Glucose 05/29/2019 Negative  Negative Final    Ketone 05/29/2019 Trace* Negative Final    Blood 05/29/2019 Negative  Negative Final    Bilirubin 05/29/2019 Negative  Negative Final    Urobilinogen 05/29/2019 0.2  0.2 - 1.0 mg/dL Final    Nitrites 05/29/2019 Negative  Negative Final    Microscopic Examination 05/29/2019 Comment   Final    Microscopic follows if indicated.  Microscopic exam 05/29/2019 See additional order   Final    Microscopic was indicated and was performed.  URINALYSIS REFLEX 05/29/2019 Comment   Final    This specimen will not reflex to a Urine Culture.  VITAMIN D, 25-HYDROXY 05/29/2019 22.4* 30.0 - 100.0 ng/mL Final    Comment: Vitamin D deficiency has been defined by the [facility] practice guideline as a  level of serum 25-OH vitamin D less than 20 ng/mL (1,2). The Endocrine Society went on to further define vitamin D  insufficiency as a level between 21 and 29 ng/mL (2). 1. IOM ([place] of Medicine). 2010. Dietary reference     intakes for calcium and D. [address] [facility]: The     [employer].   2. Oralia URBINA, Neal SRIVASTAVA, Maria BETANCOURT, et al.     Evaluation, treatment, and prevention of vitamin D     deficiency: an Endocrine Society clinical practice     guideline. JCEM. 2011 Jul; 96(0):1911-30.  Creatine Kinase,Total 05/29/2019 77  24 - 204 U/L Final    Creatine Kinase (CK), MB 05/29/2019 2.9  0.0 - 10.4 ng/mL Final    Color (UA POC) 05/29/2019 Dark Yellow   Final    Clarity (UA POC) 05/29/2019 Turbid   Final    Glucose (UA POC) 05/29/2019 Negative  Negative Final    Bilirubin (UA POC) 05/29/2019 1+  Negative Final    Ketones (UA POC) 05/29/2019 Negative  Negative Final    Specific gravity (UA POC) 05/29/2019 1.020  1.001 - 1.035 Final    Blood (UA POC) 05/29/2019 Negative  Negative Final    pH (UA POC) 05/29/2019 5.0  4.6 - 8.0 Final    Protein (UA POC) 05/29/2019 3+  Negative Final    Urobilinogen (UA POC) 05/29/2019 0.2 mg/dL  0.2 - 1 Final    Nitrites (UA POC) 05/29/2019 Negative  Negative Final    Leukocyte esterase (UA POC) 05/29/2019 Negative  Negative Final    SPECIMEN STATUS REPORT 05/29/2019 COMMENT   Final    Comment: No Test Indicated  A light blue top tube was received with no test indicated  Dear Doctor,  The requisition we received for the above patient has no test  indicated on the request form for one or more of the specimens  submitted.   The [place] Code of [employer] requires a  written and signed request be forwarded to the testing laboratory  following the verbal order of a laboratory test.  Date:_________________________________  ICD-9/10 Diagnosis Code(s):___________________________________________  Physician or Authorized Designee Signature:  ______________________________________________________________________  Your signature confirms your order of the test(s) listed  Required test name(s):________________________________________________  Required test number(s):______________________________________________  Please provide requested information and fax to [phone]  to expedite testing.  WBC 05/29/2019 0-5  0 - 5 /hpf Final    RBC 05/29/2019 0-2  0 - 2 /hpf Final    Epithelial cells 05/29/2019 0-10  0 - 10 /hpf Final    Casts 05/29/2019 None seen  None seen /lpf Final    Crystals 05/29/2019 Present* N/A Final    Crystal type 05/29/2019 Calcium Oxalate  N/A Final    Mucus 05/29/2019 Present  Not Estab. Final    Bacteria 05/29/2019 Few  None seen/Few Final     Disclaimer:   Mr. Kym León has been advised to call or return to our office if symptoms worsen/change/persist. We as a care team including the patient; discussed expected course/resolution/complications of diagnosis in detail today. Medication risks/benefits/costs/interactions/alternatives discussed Kym León was given an after visit summary which includes diagnoses, current medications, & vitals. Drew Guzmán expressed understanding with the diagnosis and plan.

## 2019-07-31 NOTE — PROGRESS NOTES
Reason for Visit   Anshu Bonilla is a [de-identified] y.o. male patient who presents today for :  Chief Complaint   Patient presents with    Shaking     Pt this am shaking , c/o chest pain at home, here he says no pain, c/o of being hot,     Chest Pain     Follow Up: Follow-up and Dispositions    · Return if symptoms worsen or fail to improve. Diagnosis/Treatment Plan:      Mr. Rishi Benitez is here with his son Benjamin Clemente for acute need this morning. Mrs. Rishi Benitez called her son Benjamin Clemente at 26 am with complaints of Mr. Rishi Benitez' shaking his hands in a moment of anxiety as well as he was complaining of chest pressure and tightness. Mrs. Rishi Benitez is also concerned that Mr. Rishi Benitez' samaniego apple is bigger and more promine now. She is worried something is wrong with his throat. Benjamin Clemente does report some issues with swallowing but that has been going on for many years without resolution. His exam today revealed a significant weight loss. I did a swallow test in office and Mr. Rishi Benitez did not cough or get choked up on the water at all. His weight loss has made his neck muscles and anatomy more prominent and notable. I do not believe there is anything out of the normal for him related to his neck anatomy. The family was supposed to have gotten Mr. Risih Benitez on Hospice services due advancing dementia, weight loss, and sundowning behaviors. They have no resources or support system as Mrs. Rishi Benitez is also mildly anxious and reads all medication bottles without understanding these treatments are necessary for Mr. Rishi Benitez. Benjamin Clemente is Mr. Rishi Benitez main care provider for the most part. He spends all day with Benjamin Clemente and then is returned home to his wife for dinner and bed. The evening have been reported as being a bit better in the last 2 weeks as some of the sundowning has resolved. They are having some events around bedtime but mostly its when the anxiety gets worked up and Mrs. Rishi Benitez does not know how to calm him down.  They have not been using the Ativan as directed. There is not really a clear person in control of the events of the home. I discussed with Cruzito Moreno today the need for Hospice as they are a direct life line to keeping Mr. Angel Cook at home and managing these behaviors. I have reached out again to [facility] with [facility] with the urgency of getting with the Angel Cook' due to an additional 7# weight loss in 2 weeks for Mr. Angel Cook. He will make contact and try to nail down a time to meet and get Mr. Angel Cook set up. I have discussed with Cruzito Moreno today that I can not guarantee that Mr. Angel Cook is not having a cardiac event or urgent matter but my assessment is he is at baseline minus the weight loss. I have offered a referral tot he ER for further work up but Cruzito Moreno declines this at this time and that is not my suggested route of treatment or care either as this could cause more behaviors related to his memory and cognitive emotions. I have educated Cruzito Moreno again on the use of the Ativan as this is the most appropriate treatment at this time for his behaviors to help with controlling his actions. I have again pushed for them to please get Hospice involved and I gave them samples of Ensure and recommended he drink them a few times per day for nutrients as his strength is dwindling and progressing faster than previously expected. ICD-10-CM ICD-9-CM    1. Protein-calorie malnutrition, severe (HCC) E43 262 REFERRAL TO HOSPICE   2. 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 Alzheimer's dementia, late onset, with behavioral disturbance G30.1 331.0 REFERRAL TO HOSPICE    F02.81 294.11    4. Primary senile degenerative dementia, with behavioral disturbance F03.91 294.21 REFERRAL TO HOSPICE   5. Sundowning F05 [id number] REFERRAL TO HOSPICE   6. Reactive depression F32.9 300.4 REFERRAL TO HOSPICE   7. Shaking R25.1 781.0 AMB POC GLUCOSE BLOOD, BY GLUCOSE MONITORING DEVICE      REFERRAL TO HOSPICE   8.  Excessive sweating R61 780.8 AMB POC GLUCOSE BLOOD, BY GLUCOSE MONITORING DEVICE      REFERRAL TO HOSPICE   9. Coronary artery disease due to lipid rich plaque I25.10 414.00 REFERRAL TO HOSPICE    I25.83 414.3         Objective:     Vitals:    07/31/19 1000 07/31/19 1013   BP: (!) 159/91 142/78   Pulse: 78    Resp: 18    SpO2: 97%    Weight: 179 lb 8 oz (81.4 kg)    Height: 5' 4\" (1.626 m)      Wt Readings from Last 3 Encounters:   07/31/19 179 lb 8 oz (81.4 kg)   07/16/19 186 lb (84.4 kg)   06/12/19 194 lb (88 kg)     BP Readings from Last 3 Encounters:   07/31/19 142/78   07/16/19 130/62   06/12/19 146/70     Review of Systems   Unable to perform ROS: Dementia     Physical Exam   Constitutional: Vital signs are normal. He appears malnourished. He appears to not be writhing in pain and not dehydrated. He appears unhealthy. He appears toxic. He does not have a sickly appearance. He appears distressed. Frail, fragile, elderly, , male presents with his wife and 2 sons to establish care due to advancing memory loss. Mr. Edward Kaufman is responsive to questions but unsure of answers. He has repeat jimmie thoughts, questions, & processing. HENT:   Head: Normocephalic and atraumatic. Right Ear: A foreign body is present. A middle ear effusion is present. Decreased hearing is noted. Left Ear: A foreign body is present. A middle ear effusion is present. Decreased hearing is noted. Nose: Nose normal.   Mouth/Throat: Uvula is midline. Mucous membranes are not pale, dry and not cyanotic. No oral lesions. No uvula swelling. Posterior oropharyngeal erythema present. No posterior oropharyngeal edema. Eyes: Conjunctivae and lids are normal. Lids are everted and swept, no foreign bodies found. Right eye exhibits abnormal extraocular motion. Left eye exhibits abnormal extraocular motion. Left pupil is not reactive. Pupils are unequal.   Left eye is wandering. Reduction in visual acuity on exam. Delayed reaction to light.  He is currently on CBD gummies so his pupil could be less reactive to light. Neck: Trachea normal, normal range of motion and full passive range of motion without pain. Neck supple. No hepatojugular reflux and no JVD present. Carotid bruit is present. No thyromegaly present. Cardiovascular: Regular rhythm, S1 normal, S2 normal, intact distal pulses and normal pulses. Occasional extrasystoles are present. Bradycardia present. Exam reveals distant heart sounds. Exam reveals no gallop and no friction rub. Murmur heard. No extremity edema is present during today's exam.    Pulmonary/Chest: Effort normal and breath sounds normal.   Abdominal: Soft. Normal appearance and bowel sounds are normal. There is no hepatosplenomegaly. There is no tenderness. There is no CVA tenderness. Musculoskeletal:        Lumbar back: He exhibits decreased range of motion and deformity. Neurological: He is alert. He is disoriented. He displays weakness, atrophy, abnormal stance and abnormal reflex. A cranial nerve deficit and sensory deficit is present. He exhibits abnormal muscle tone. Coordination and gait abnormal. GCS score is 15. Skin: Skin is warm, dry and intact. No bruising and no rash noted. He is not diaphoretic. No cyanosis. No pallor. Nails show no clubbing. Psychiatric: His affect is inappropriate. He expresses impulsivity. He exhibits a depressed mood. He is apathetic. He exhibits disordered thought content, abnormal new learning ability, abnormal recent memory and abnormal remote memory. Establishing    Nursing note and vitals reviewed. Discontinued Care: The following treatment modalities have been discontinued by the provider today:   There are no discontinued medications. Subjective:   No Known Allergies  Prior to Admission medications    Medication Sig Start Date End Date Taking? Authorizing Provider   busPIRone (BUSPAR) 5 mg tablet Take 1 Tab by mouth three (3) times daily (with meals).  1 tablet by mouth at breakfast & dinner daily. Do not stop this medication. 7/16/19  Yes Gilmar Smith NP   omeprazole (PRILOSEC) 40 mg capsule Take 1 Cap by mouth daily. Indications: gastroesophageal reflux disease 7/16/19  Yes Gilmar Smith NP   OTHER 1 CBD Gummy by mouth up to 4x per day. 7/16/19  Yes Gilmar Smith NP   LORazepam (ATIVAN) 0.5 mg tablet 1 tab by mouth daily at BEDTIME NIGHTLY, if needed may give 1/2 tab as for behaviors up to 3 x daily 7/16/19  Yes Gilmar Smith NP   ergocalciferol (ERGOCALCIFEROL) 50,000 unit capsule Take 1 Cap by mouth every seven (7) days. Indications: Vitamin D Deficiency (High Dose Therapy) 6/12/19  Yes Gilmar Smith NP   rosuvastatin (CRESTOR) 5 mg tablet Take 1 Tab by mouth nightly. 6/12/19  Yes Gilmar Smith NP   terazosin (HYTRIN) 1 mg capsule Take 1 Cap by mouth daily (with breakfast). 6/12/19  Yes Gilmar Smith NP   aspirin delayed-release 81 mg tablet Take 2 Tabs by mouth daily. 6/12/19  Yes Gilmar Smith NP   amLODIPine (NORVASC) 5 mg tablet Take 5 mg by mouth daily.    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    Hypertension 10/12/2016    DENIES ON INTERVIEW, BUT TAKES AMLODIPINE    Ill-defined condition     memory problems    Kidney stones     SURGERY FOR 2014; ANOTHER \"PASSED\"    Memory problem     WIFE MAY HAVE MEMORY ISSUES ALSO     Past Surgical History:   Procedure Laterality Date    HX APPENDECTOMY      HX OTHER SURGICAL      CLOSURE TRAUMATIC AMPUTATION TIP L INDEX FINGER    HX UROLOGICAL      STONE EXTRACTION      Social History     Tobacco Use    Smoking status: Never Smoker    Smokeless tobacco: Never Used   Substance Use Topics    Alcohol use: No    Drug use: No      Family History   Problem Relation Age of Onset    Cancer Father         POSSIBLY THROAT CA (NON-SMOKER)    Arthritis-osteo Brother         PROBLEMS AT A YOUNG AGE-NOT SURE IS RA    Anesth Problems Neg Hx      Advance Care Planning 6/30/2019   Patient's Healthcare Decision Maker is: Legal Next of Kin   Primary Decision Maker Name -   Primary Decision Maker Phone Number -   Primary Decision Maker Relationship to Patient -   Confirm Advance Directive Yes, not on file   Patient Would Like to Complete Advance Directive -   Does the patient have other document types Power of      Test Results:     Office Visit on 07/31/2019   Component Date Value Ref Range Status    Glucose POC 07/31/2019 110  mg/dL Final   Office Visit on 07/16/2019   Component Date Value Ref Range Status    Glucose 07/16/2019 99  65 - 99 mg/dL Final    BUN 07/16/2019 6* 8 - 27 mg/dL Final    Creatinine 07/16/2019 0.80  0.76 - 1.27 mg/dL Final    GFR est non-AA 07/16/2019 84  >59 mL/min/1.73 Final    GFR est AA 07/16/2019 98  >59 mL/min/1.73 Final    BUN/Creatinine ratio 07/16/2019 8* 10 - 24 Final    Sodium 07/16/2019 141  134 - 144 mmol/L Final    Potassium 07/16/2019 4.2  3.5 - 5.2 mmol/L Final    Chloride 07/16/2019 102  96 - 106 mmol/L Final    CO2 07/16/2019 23  20 - 29 mmol/L Final    Calcium 07/16/2019 9.2  8.6 - 10.2 mg/dL Final    Protein, total 07/16/2019 6.2  6.0 - 8.5 g/dL Final    Albumin 07/16/2019 4.5  3.5 - 4.7 g/dL Final    GLOBULIN, TOTAL 07/16/2019 1.7  1.5 - 4.5 g/dL Final    A-G Ratio 07/16/2019 2.6* 1.2 - 2.2 Final    Bilirubin, total 07/16/2019 0.8  0.0 - 1.2 mg/dL Final    Alk.  phosphatase 07/16/2019 72  39 - 117 IU/L Final    AST (SGOT) 07/16/2019 23  0 - 40 IU/L Final    ALT (SGPT) 07/16/2019 16  0 - 44 IU/L Final    Cholesterol, total 07/16/2019 122  100 - 199 mg/dL Final    Triglyceride 07/16/2019 59  0 - 149 mg/dL Final    HDL Cholesterol 07/16/2019 41  >39 mg/dL Final    VLDL, calculated 07/16/2019 12  5 - 40 mg/dL Final    LDL, calculated 07/16/2019 69  0 - 99 mg/dL Final    VITAMIN D, 25-HYDROXY 07/16/2019 41.2  30.0 - 100.0 ng/mL Final Comment: Vitamin D deficiency has been defined by the [facility] practice guideline as a  level of serum 25-OH vitamin D less than 20 ng/mL (1,2). The Endocrine Society went on to further define vitamin D  insufficiency as a level between 21 and 29 ng/mL (2). 1. IOM ([place] of Medicine). 2010. Dietary reference     intakes for calcium and D. [facility]: The     [employer]. 2. Oralia MF, Neal NC, Maria BETANCOURT, et al.     Evaluation, treatment, and prevention of vitamin D     deficiency: an Endocrine Society clinical practice     guideline. JCEM. 2011 Jul; 96(2):1911-30. Office Visit on 05/29/2019   Component Date Value Ref Range Status    Amylase 05/29/2019 49  31 - 124 U/L Final    Hepatitis A Ab, IgM 05/29/2019 Negative  Negative Final    Hep B surface Ag screen 05/29/2019 Negative  Negative Final    Hep B Core Ab, IgM 05/29/2019 Negative  Negative Final    Hep C Virus Ab 05/29/2019 <0.1  0.0 - 0.9 s/co ratio Final    Comment:                                   Negative:     < 0.8                               Indeterminate: 0.8 - 0.9                                    Positive:     > 0.9   The CDC recommends that a positive HCV antibody result   be followed up with a HCV Nucleic Acid Amplification   test (501010).  TIBC 05/29/2019 276  250 - 450 ug/dL Final    UIBC 05/29/2019 195  111 - 343 ug/dL Final    Iron 05/29/2019 81  38 - 169 ug/dL Final    Iron % saturation 05/29/2019 29  15 - 55 % Final    Ferritin 05/29/2019 139  30 - 400 ng/mL Final    Vitamin B12 05/29/2019 370  232 - 1,245 pg/mL Final    Folate 05/29/2019 >20.0  >3.0 ng/mL Final    Comment: A serum folate concentration of less than 3.1 ng/mL is  considered to represent clinical deficiency.       WBC 05/29/2019 6.2  3.4 - 10.8 x10E3/uL Final    RBC 05/29/2019 4.72  4.14 - 5.80 x10E6/uL Final    HGB 05/29/2019 15.0  13.0 - 17.7 g/dL Final    HCT 05/29/2019 43.2  37.5 - 51.0 % Final    MCV 05/29/2019 92  79 - 97 fL Final    MCH 05/29/2019 31.8  26.6 - 33.0 pg Final    MCHC 05/29/2019 34.7  31.5 - 35.7 g/dL Final    RDW 05/29/2019 14.0  12.3 - 15.4 % Final    PLATELET 50/94/3688 473  150 - 450 x10E3/uL Final                  **Please note reference interval change**    NEUTROPHILS 05/29/2019 58  Not Estab. % Final    Lymphocytes 05/29/2019 33  Not Estab. % Final    MONOCYTES 05/29/2019 8  Not Estab. % Final    EOSINOPHILS 05/29/2019 1  Not Estab. % Final    BASOPHILS 05/29/2019 0  Not Estab. % Final    ABS. NEUTROPHILS 05/29/2019 3.6  1.4 - 7.0 x10E3/uL Final    Abs Lymphocytes 05/29/2019 2.0  0.7 - 3.1 x10E3/uL Final    ABS. MONOCYTES 05/29/2019 0.5  0.1 - 0.9 x10E3/uL Final    ABS. EOSINOPHILS 05/29/2019 0.1  0.0 - 0.4 x10E3/uL Final    ABS. BASOPHILS 05/29/2019 0.0  0.0 - 0.2 x10E3/uL Final    IMMATURE GRANULOCYTES 05/29/2019 0  Not Estab. % Final    ABS. IMM. GRANS. 05/29/2019 0.0  0.0 - 0.1 x10E3/uL Final    Lipase 05/29/2019 68  13 - 78 U/L Final    Troponin-I, Qt. 05/29/2019 <0.01  0.00 - 0.04 ng/mL Final    Testosterone 05/29/2019 347  264 - 916 ng/dL Final    Comment: Adult male reference interval is based on a population of  healthy nonobese males (BMI <30) between 23and 44years old. [address] [place] [address], [address] [id number]. PMID: [id number].       Free testosterone (Direct) 05/29/2019 6.5* 6.6 - 18.1 pg/mL Final    Hemoglobin A1c 05/29/2019 5.4  4.8 - 5.6 % Final    Comment:          Prediabetes: 5.7 - 6.4           Diabetes: >6.4           Glycemic control for adults with diabetes: <7.0      Estimated average glucose 05/29/2019 108  mg/dL Final    Cholesterol, total 05/29/2019 177  100 - 199 mg/dL Final    Triglyceride 05/29/2019 113  0 - 149 mg/dL Final    HDL Cholesterol 05/29/2019 36* >39 mg/dL Final    VLDL, calculated 05/29/2019 23  5 - 40 mg/dL Final    LDL, calculated 05/29/2019 118* 0 - 99 mg/dL Final    Magnesium 05/29/2019 2.2  1.6 - 2.3 mg/dL Final    Glucose 05/29/2019 93  65 - 99 mg/dL Final    BUN 05/29/2019 6* 8 - 27 mg/dL Final    Creatinine 05/29/2019 0.82  0.76 - 1.27 mg/dL Final    GFR est non-AA 05/29/2019 84  >59 mL/min/1.73 Final    GFR est AA 05/29/2019 97  >59 mL/min/1.73 Final    BUN/Creatinine ratio 05/29/2019 7* 10 - 24 Final    Sodium 05/29/2019 142  134 - 144 mmol/L Final    Potassium 05/29/2019 4.7  3.5 - 5.2 mmol/L Final    Chloride 05/29/2019 103  96 - 106 mmol/L Final    CO2 05/29/2019 23  20 - 29 mmol/L Final    Calcium 05/29/2019 9.0  8.6 - 10.2 mg/dL Final    Protein, total 05/29/2019 6.7  6.0 - 8.5 g/dL Final    Albumin 05/29/2019 4.5  3.5 - 4.7 g/dL Final    GLOBULIN, TOTAL 05/29/2019 2.2  1.5 - 4.5 g/dL Final    A-G Ratio 05/29/2019 2.0  1.2 - 2.2 Final    Bilirubin, total 05/29/2019 1.0  0.0 - 1.2 mg/dL Final    Alk.  phosphatase 05/29/2019 75  39 - 117 IU/L Final    AST (SGOT) 05/29/2019 18  0 - 40 IU/L Final    ALT (SGPT) 05/29/2019 10  0 - 44 IU/L Final    Creatinine, urine 05/29/2019 190.8  Not Estab. mg/dL Final    Microalbumin, urine 05/29/2019 6.6  Not Estab. ug/mL Final    Microalb/Creat ratio (ug/mg creat.) 05/29/2019 3.5  0.0 - 30.0 mg/g creat Final    Comment:                      Normal:                0.0 -  30.0                       Albuminuria:          31.0 - 300.0                       Clinical albuminuria:       >300.0      PROBNP 05/29/2019 346  0 - 486 pg/mL Final    Comment: The following cut-points have been suggested for the  use of proBNP for the diagnostic evaluation of heart  failure (HF) in patients with acute dyspnea:  Modality                     Age           Optimal Cut                             (years)            Point  ------------------------------------------------------  Diagnosis (rule in HF)        <50            450 pg/mL                            50 - 75            900 pg/mL                                >75           1800 pg/mL  Exclusion (rule out HF)  Age independent     300 pg/mL      Prealbumin 05/29/2019 22  9 - 32 mg/dL Final    Prostate Specific Ag 05/29/2019 3.9  0.0 - 4.0 ng/mL Final    Comment: Roche ECLIA methodology. According to the American Urological Association, Serum PSA should  decrease and remain at undetectable levels after radical  prostatectomy. The AUA defines biochemical recurrence as an initial  PSA value 0.2 ng/mL or greater followed by a subsequent confirmatory  PSA value 0.2 ng/mL or greater. Values obtained with different assay methods or kits cannot be used  interchangeably. Results cannot be interpreted as absolute evidence  of the presence or absence of malignant disease.  Reflex Criteria 05/29/2019 Comment   Final    Comment: The percent free PSA is performed on a reflex basis only when the  total PSA is between 4.0 and 10.0 ng/mL.  TSH 05/29/2019 0.718  0.450 - 4.500 uIU/mL Final    T4, Total 05/29/2019 7.6  4.5 - 12.0 ug/dL Final    T3 Uptake 05/29/2019 26  24 - 39 % Final    Free Thyroxine Index (FTI) 05/29/2019 2.0  1.2 - 4.9 Final    Specific Gravity 05/29/2019 1.020  1.005 - 1.030 Final    pH (UA) 05/29/2019 5.5  5.0 - 7.5 Final    Color 05/29/2019 Yellow  Yellow Final    Appearance 05/29/2019 Clear  Clear Final    Leukocyte Esterase 05/29/2019 Negative  Negative Final    Protein 05/29/2019 Negative  Negative/Trace Final    Glucose 05/29/2019 Negative  Negative Final    Ketone 05/29/2019 Trace* Negative Final    Blood 05/29/2019 Negative  Negative Final    Bilirubin 05/29/2019 Negative  Negative Final    Urobilinogen 05/29/2019 0.2  0.2 - 1.0 mg/dL Final    Nitrites 05/29/2019 Negative  Negative Final    Microscopic Examination 05/29/2019 Comment   Final    Microscopic follows if indicated.  Microscopic exam 05/29/2019 See additional order   Final    Microscopic was indicated and was performed.     URINALYSIS REFLEX 05/29/2019 Comment   Final    This specimen will not reflex to a Urine Culture.  VITAMIN D, 25-HYDROXY 05/29/2019 22.4* 30.0 - 100.0 ng/mL Final    Comment: Vitamin D deficiency has been defined by the [facility] practice guideline as a  level of serum 25-OH vitamin D less than 20 ng/mL (1,2). The Endocrine Society went on to further define vitamin D  insufficiency as a level between 21 and 29 ng/mL (2). 1. IOM ([place] of Medicine). 2010. Dietary reference     intakes for calcium and D. [facility]: The     [employer]. 2. Oralia MF, Neal SRIVASTAVA, Maria BETNACOURT, et al.     Evaluation, treatment, and prevention of vitamin D     deficiency: an Endocrine Society clinical practice     guideline. JCEM. 2011 Jul; 96(7):1911-30.  Creatine Kinase,Total 05/29/2019 77  24 - 204 U/L Final    Creatine Kinase (CK), MB 05/29/2019 2.9  0.0 - 10.4 ng/mL Final    Color (UA POC) 05/29/2019 Dark Yellow   Final    Clarity (UA POC) 05/29/2019 Turbid   Final    Glucose (UA POC) 05/29/2019 Negative  Negative Final    Bilirubin (UA POC) 05/29/2019 1+  Negative Final    Ketones (UA POC) 05/29/2019 Negative  Negative Final    Specific gravity (UA POC) 05/29/2019 1.020  1.001 - 1.035 Final    Blood (UA POC) 05/29/2019 Negative  Negative Final    pH (UA POC) 05/29/2019 5.0  4.6 - 8.0 Final    Protein (UA POC) 05/29/2019 3+  Negative Final    Urobilinogen (UA POC) 05/29/2019 0.2 mg/dL  0.2 - 1 Final    Nitrites (UA POC) 05/29/2019 Negative  Negative Final    Leukocyte esterase (UA POC) 05/29/2019 Negative  Negative Final    SPECIMEN STATUS REPORT 05/29/2019 COMMENT   Final    Comment: No Test Indicated  A light blue top tube was received with no test indicated  Dear Doctor,  The requisition we received for the above patient has no test  indicated on the request form for one or more of the specimens  submitted.   The [place] Code of [employer] requires a  written and signed request be forwarded to the testing laboratory  following the verbal order of a laboratory test.  Date:_________________________________  ICD-9/10 Diagnosis Code(s):___________________________________________  Physician or Authorized Designee Signature:  ______________________________________________________________________  Your signature confirms your order of the test(s) listed  Required test name(s):________________________________________________  Required test number(s):______________________________________________  Please provide requested information and fax to [phone]  to expedite testing.  WBC 05/29/2019 0-5  0 - 5 /hpf Final    RBC 05/29/2019 0-2  0 - 2 /hpf Final    Epithelial cells 05/29/2019 0-10  0 - 10 /hpf Final    Casts 05/29/2019 None seen  None seen /lpf Final    Crystals 05/29/2019 Present* N/A Final    Crystal type 05/29/2019 Calcium Oxalate  N/A Final    Mucus 05/29/2019 Present  Not Estab. Final    Bacteria 05/29/2019 Few  None seen/Few Final     Disclaimer:   Mr. Judy Torrez has been advised to call or return to our office if symptoms worsen/change/persist. We as a care team including the patient; discussed expected course/resolution/complications of diagnosis in detail today. Medication risks/benefits/costs/interactions/alternatives discussed Judy Torrez was given an after visit summary which includes diagnoses, current medications, & vitals. Drew Guzmán expressed understanding with the diagnosis and plan.

## 2019-07-31 NOTE — PATIENT INSTRUCTIONS
Dementia: Care Instructions  Your Care Instructions    Dementia is a loss of mental skills that affects your daily life. It is different than the occasional trouble with memory that is part of aging. You may find it hard to remember things that you feel you should be able to remember. Or you may feel that your mind is just not working as well as usual.  Finding out that you have dementia is a shock. You may be afraid and worried about how the condition will change your life. Although there is no cure at this time, medicine may slow memory loss and improve thinking for a while. Other medicines may be able to help you sleep or cope with depression and behavior changes. Dementia often gets worse slowly. But it can get worse quickly. As dementia gets worse, it may become harder to do common things that take planning, like making a list and going shopping. Over time, the disease may make it hard for you to take care of yourself. Some people with dementia need others to help care for them. Dementia is different for everyone. You may be able to function well for a long time. In the early stage of the condition, you can do things at home to make life easier and safer. You also can keep doing your hobbies and other activities. Many people find comfort in planning now for their future needs. Follow-up care is a key part of your treatment and safety. Be sure to make and go to all appointments, and call your doctor if you are having problems. It's also a good idea to know your test results and keep a list of the medicines you take. How can you care for yourself at home? · Take your medicines exactly as prescribed. Call your doctor if you think you are having a problem with your medicine. · Eat healthy foods. Eat lots of whole grains, fruits, and vegetables every day.  If you are not hungry, try snacks or nutritional drinks such as Boost, Ensure, or Sustacal.  · If you have problems sleeping:  ? Try not to nap too close to your bedtime. ? Exercise regularly. Walking is a good choice. ? Try a glass of warm milk or caffeine-free herbal tea before bed. · Do tasks and activities during the time of day when you feel your best. It may help to develop a daily routine. · Post labels, lists, and sticky notes to help you remember things. Write your activities on a calendar you can easily find. Put your clock where you can easily see it. · Stay active. Take walks in familiar places, or with friends or loved ones. Try to stay active mentally too. Read and work crossword puzzles if you enjoy these activities. · Do not drive unless you can pass an on-road driving test. If you are not sure if you are safe to drive, your state 's license bureau can test you. · Keep a cordless phone and a flashlight with new batteries by your bed. If possible, put a phone in each of the main rooms of your house, or carry a cell phone in case you fall and cannot reach a phone. Or, you can wear a device around your neck or wrist. You push a button that sends a signal for help. Acknowledge your emotions and plan for the future  · Talk openly and honestly with your doctor. · Let yourself grieve. It is common to feel angry, scared, frustrated, anxious, or depressed. · Get emotional support from family, friends, a support group, or a counselor experienced in working with people who have dementia. · Ask for help if you need it. · Tell your doctor how you feel. You may feel upset, angry, or worried at times. Many things can cause this, including poor sleep, medicine side effects, confusion, and pain. Your doctor may be able to help you. · Plan for the future. ? Talk to your family and doctor about preparing a living will and other important papers while you can make decisions. These papers tell your doctors how to care for you at the end of your life. ? Consider naming a person to make decisions about your care if you are not able to.   When should you call for help? Call 911 anytime you think you may need emergency care. For example, call if:    · You are lost and do not know whom to call.     · You are injured and do not know whom to call.    Call your doctor now or seek immediate medical care if:    · You are more confused or upset than usual.     · You feel like you could hurt yourself because your mind is not working well.   [place] closely for changes in your health, and be sure to contact your doctor if you have any problems. Where can you learn more? Go to http://sheridan-amaris.info/. Enter F935 in the search box to learn more about \"Dementia: Care Instructions. \"  Current as of: September 11, 2018  Content Version: 12.1  © 9079-2898 [employer]. Care instructions adapted under license by [employer] (which disclaims liability or warranty for this information). If you have questions about a medical condition or this instruction, always ask your healthcare professional. [address] any warranty or liability for your use of this information.

## 2019-07-31 NOTE — PROGRESS NOTES
ADVISED PATIENT OF THE FOLLOWING HEALTH MAINTAINCE DUE  There are no preventive care reminders to display for this patient. Chief Complaint   Patient presents with    Shaking     Pt this am shaking , c/o chest pain at home, here he says no pain, c/o of being hot,     Chest Pain       1. Have you been to the ER, urgent care clinic since your last visit? Hospitalized since your last visit? No    2. Have you seen or consulted any other health care providers outside of the [address] [place] [address] since your last visit? Include any DEXA scan, mammography  or colon screening. No    3. Do you have an Advance Directive on file? no    4. Do you have a DNR on file? Full    Patient is accompanied by self and son I have received verbal consent from [address] [place] [address] to discuss any/all medical information while they are present in the room. Advance Care Planning 6/30/2019   Patient's Healthcare Decision Maker is: Legal Next of Kin   Primary Decision Maker Name -   Primary Decision Maker Phone Number -   Primary Decision Maker Relationship to Patient -   Confirm Advance Directive Yes, not on file   Patient Would Like to Complete Advance Directive -   Does the patient have other document types Power of          [facility] [address] [facility] [address]  Phone: [phone] Fax: [phone]        Patient reminded during visit to bring all medication bottles, OTC medications to all appointments.